# Patient Record
Sex: FEMALE | Race: WHITE | Employment: OTHER | URBAN - METROPOLITAN AREA
[De-identification: names, ages, dates, MRNs, and addresses within clinical notes are randomized per-mention and may not be internally consistent; named-entity substitution may affect disease eponyms.]

---

## 2017-02-17 ENCOUNTER — TELEPHONE (OUTPATIENT)
Dept: FAMILY MEDICINE CLINIC | Facility: CLINIC | Age: 77
End: 2017-02-17

## 2017-02-17 DIAGNOSIS — E11.9 TYPE 2 DIABETES MELLITUS WITHOUT COMPLICATION, WITHOUT LONG-TERM CURRENT USE OF INSULIN (HCC): Primary | ICD-10-CM

## 2017-02-17 RX ORDER — BISOPROLOL FUMARATE AND HYDROCHLOROTHIAZIDE 6.25; 5 MG/1; MG/1
1 TABLET ORAL DAILY
COMMUNITY
Start: 2007-06-26 | End: 2017-06-05

## 2017-02-17 RX ORDER — ENALAPRIL MALEATE 10 MG/1
1 TABLET ORAL DAILY
COMMUNITY
Start: 2007-06-26 | End: 2017-06-05

## 2017-02-17 RX ORDER — DILTIAZEM HYDROCHLORIDE 120 MG/1
1 CAPSULE, COATED, EXTENDED RELEASE ORAL 2 TIMES DAILY
COMMUNITY
Start: 2009-06-23 | End: 2017-06-05

## 2017-02-17 RX ORDER — LEVOTHYROXINE SODIUM 0.1 MG/1
1 TABLET ORAL
COMMUNITY
Start: 2007-06-26 | End: 2017-12-08

## 2017-02-17 RX ORDER — CETIRIZINE HYDROCHLORIDE 10 MG/1
1 TABLET ORAL DAILY PRN
COMMUNITY
Start: 2011-06-10

## 2017-02-17 RX ORDER — ASPIRIN 81 MG/1
1 TABLET ORAL DAILY
COMMUNITY
Start: 2012-06-15 | End: 2018-03-16

## 2017-02-25 ENCOUNTER — TELEPHONE (OUTPATIENT)
Dept: FAMILY MEDICINE CLINIC | Facility: CLINIC | Age: 77
End: 2017-02-25

## 2017-02-25 NOTE — TELEPHONE ENCOUNTER
Patient requesting refill Victoza/Pen [place].   Can Dockery, 02/25/2017, 10:58 AM

## 2017-05-04 ENCOUNTER — OFFICE VISIT (OUTPATIENT)
Dept: FAMILY MEDICINE CLINIC | Facility: CLINIC | Age: 77
End: 2017-05-04

## 2017-05-04 VITALS
SYSTOLIC BLOOD PRESSURE: 136 MMHG | RESPIRATION RATE: 16 BRPM | DIASTOLIC BLOOD PRESSURE: 86 MMHG | OXYGEN SATURATION: 98 % | HEART RATE: 69 BPM | TEMPERATURE: 99 F | WEIGHT: 137.13 LBS | HEIGHT: 60 IN | BODY MASS INDEX: 26.92 KG/M2

## 2017-05-04 DIAGNOSIS — B96.89 BACTERIAL CONJUNCTIVITIS OF BOTH EYES: Primary | ICD-10-CM

## 2017-05-04 DIAGNOSIS — J20.9 ACUTE BRONCHITIS, UNSPECIFIED ORGANISM: ICD-10-CM

## 2017-05-04 DIAGNOSIS — H10.9 BACTERIAL CONJUNCTIVITIS OF BOTH EYES: Primary | ICD-10-CM

## 2017-05-04 PROCEDURE — 99214 OFFICE O/P EST MOD 30 MIN: CPT | Performed by: NURSE PRACTITIONER

## 2017-05-04 RX ORDER — AZITHROMYCIN 250 MG/1
TABLET, FILM COATED ORAL
Qty: 6 TABLET | Refills: 0 | Status: SHIPPED | OUTPATIENT
Start: 2017-05-04 | End: 2017-05-08 | Stop reason: ALTCHOICE

## 2017-05-04 RX ORDER — CIPROFLOXACIN HYDROCHLORIDE 3.5 MG/ML
SOLUTION/ DROPS TOPICAL
Qty: 10 ML | Refills: 0 | Status: SHIPPED | OUTPATIENT
Start: 2017-05-04 | End: 2017-11-03

## 2017-05-04 NOTE — PROGRESS NOTES
CHIEF COMPLAINT:   Patient presents with:  Eye Problem      HPI:   Brittanie Pacheco is a 76year old female who presents with chief complaint of \"pink eye\" that first started in left eye this morning and is now moving on to the right eye within last few Subcutaneous Solution Pen-injector Inject 1.8 mg into the skin daily. Disp: 9 pen Rfl: 3   Insulin Pen Needle (BD PEN NEEDLE SHORT U/F) 31G X 8 MM Does not apply Misc Inject 1 each into the skin daily.  Use with Victoza Disp: 90 each Rfl: 3   aspirin 81 MG and injected, no discharge or crusting noted. No orbital swelling or erythema or pain with palpation of the orbits. HENT: atraumatic, normocephalic, mucoid nasal discharge, nasal mucosa erythematous and edematous.   TMs pearly, no retraction, no bulging, is asked to follow up with their PCP prn.     MAXWELL Figueroa

## 2017-05-04 NOTE — PATIENT INSTRUCTIONS
Push fluids, rest.  Antibiotic as prescribed, take with food. Take antibiotic eye drops to both eyes as prescribed. Tylenol or ibuprofen over the counter for discomfort. Return if symptoms worsen or do not improve in 2-3 days.     Go to ER if vision sigrid

## 2017-05-08 ENCOUNTER — LAB ENCOUNTER (OUTPATIENT)
Dept: LAB | Age: 77
End: 2017-05-08
Attending: NURSE PRACTITIONER

## 2017-05-08 ENCOUNTER — TELEPHONE (OUTPATIENT)
Dept: FAMILY MEDICINE CLINIC | Facility: CLINIC | Age: 77
End: 2017-05-08

## 2017-05-08 ENCOUNTER — OFFICE VISIT (OUTPATIENT)
Dept: FAMILY MEDICINE CLINIC | Facility: CLINIC | Age: 77
End: 2017-05-08

## 2017-05-08 VITALS
DIASTOLIC BLOOD PRESSURE: 74 MMHG | SYSTOLIC BLOOD PRESSURE: 112 MMHG | RESPIRATION RATE: 18 BRPM | HEART RATE: 106 BPM | WEIGHT: 132.63 LBS | HEIGHT: 60 IN | BODY MASS INDEX: 26.04 KG/M2 | OXYGEN SATURATION: 91 % | TEMPERATURE: 98 F

## 2017-05-08 DIAGNOSIS — J20.9 ACUTE BRONCHITIS, UNSPECIFIED ORGANISM: Primary | ICD-10-CM

## 2017-05-08 DIAGNOSIS — R05.9 COUGHING: ICD-10-CM

## 2017-05-08 DIAGNOSIS — R06.2 WHEEZING: ICD-10-CM

## 2017-05-08 PROCEDURE — 94640 AIRWAY INHALATION TREATMENT: CPT | Performed by: NURSE PRACTITIONER

## 2017-05-08 PROCEDURE — 99214 OFFICE O/P EST MOD 30 MIN: CPT | Performed by: NURSE PRACTITIONER

## 2017-05-08 PROCEDURE — 85025 COMPLETE CBC W/AUTO DIFF WBC: CPT

## 2017-05-08 PROCEDURE — 80053 COMPREHEN METABOLIC PANEL: CPT

## 2017-05-08 RX ORDER — METHYLPREDNISOLONE 4 MG/1
TABLET ORAL
Qty: 1 KIT | Refills: 0 | Status: SHIPPED | OUTPATIENT
Start: 2017-05-08 | End: 2017-11-03 | Stop reason: ALTCHOICE

## 2017-05-08 RX ORDER — IPRATROPIUM BROMIDE AND ALBUTEROL SULFATE 2.5; .5 MG/3ML; MG/3ML
3 SOLUTION RESPIRATORY (INHALATION) ONCE
Status: COMPLETED | OUTPATIENT
Start: 2017-05-08 | End: 2017-05-08

## 2017-05-08 RX ORDER — CODEINE PHOSPHATE AND GUAIFENESIN 10; 100 MG/5ML; MG/5ML
10 SOLUTION ORAL NIGHTLY PRN
Qty: 100 ML | Refills: 0 | Status: SHIPPED | OUTPATIENT
Start: 2017-05-08 | End: 2018-04-11 | Stop reason: ALTCHOICE

## 2017-05-08 RX ORDER — LEVOFLOXACIN 500 MG/1
500 TABLET, FILM COATED ORAL DAILY
Qty: 10 TABLET | Refills: 0 | Status: SHIPPED | OUTPATIENT
Start: 2017-05-08 | End: 2017-05-18

## 2017-05-08 RX ADMIN — IPRATROPIUM BROMIDE AND ALBUTEROL SULFATE 3 ML: 2.5; .5 SOLUTION RESPIRATORY (INHALATION) at 11:17:00

## 2017-05-08 NOTE — PROGRESS NOTES
Chief complaint:  Patient presents with:  Cough: chest congestion, and bad cough      HPI:   Michelle Beckett is a 76year old female who presents for worsening upper respiratory symptoms.  Was seen 4 days ago for bacterial conjunctivitis and acute bronchit Therapy Pack As directed. Disp: 1 kit Rfl: 0   guaiFENesin-codeine (CHERATUSSIN AC) 100-10 MG/5ML Oral Solution Take 10 mL by mouth nightly as needed for cough.  Do not use iwth alcohol or while driving or operating heavy machinery Disp: 100 mL Rfl: 0   cip headaches    EXAM:   /74 mmHg  Pulse 106  Temp(Src) 97.7 °F (36.5 °C) (Temporal)  Resp 18  Ht 60\"  Wt 132 lb 9.6 oz  BMI 25.90 kg/m2  SpO2 91%  GENERAL: well developed, well nourished,in no apparent distress  SKIN: no rashes,no suspicious lesions  E rest.  Antibiotic as prescribed, take with food. Take medrol dose pack as prescribed. Tylenol or ibuprofen over the counter for discomfort. Mucinex over the counter for symptom relief. Cheratussin with codeine as prescribed.  No alcohol or driving with

## 2017-05-08 NOTE — PATIENT INSTRUCTIONS
Push fluids, rest.  Antibiotic as prescribed, take with food. Take medrol dose pack as prescribed. Tylenol or ibuprofen over the counter for discomfort. Mucinex over the counter for symptom relief. Cheratussin with codeine as prescribed.  No alcohol or

## 2017-05-08 NOTE — TELEPHONE ENCOUNTER
Patient saw Rebel Rapp 5/4. Now having URI Sx. Cough/Congestion. Appt given 10:45 with Johanny for evaluation.   Tee Ambrose, 05/08/2017, 8:46 AM

## 2017-05-09 ENCOUNTER — TELEPHONE (OUTPATIENT)
Dept: FAMILY MEDICINE CLINIC | Facility: CLINIC | Age: 77
End: 2017-05-09

## 2017-05-09 NOTE — TELEPHONE ENCOUNTER
Pt advised of blood work. Stated she was able to get her Levaquin today and she doesn't feel \"great\" but she feels better than she did yesterday.   I advised regarding follow up blood work and pt verbalized that she has her annal physical with Dr. Griffin Celestin

## 2017-05-09 NOTE — TELEPHONE ENCOUNTER
----- Message from MAXWELL Olmedo sent at 5/9/2017 12:04 PM CDT -----  CMP: BS elevated, pt not fasting. Creatinine and GFR elevated. Last GFR 57 and Cr. 0.96 in Dec 2016.  Will recheck CMP in 1-2 months to reassess as pt is currently being treated for ac

## 2017-05-12 ENCOUNTER — OFFICE VISIT (OUTPATIENT)
Dept: FAMILY MEDICINE CLINIC | Facility: CLINIC | Age: 77
End: 2017-05-12

## 2017-05-12 ENCOUNTER — TELEPHONE (OUTPATIENT)
Dept: FAMILY MEDICINE CLINIC | Facility: CLINIC | Age: 77
End: 2017-05-12

## 2017-05-12 VITALS
TEMPERATURE: 98 F | DIASTOLIC BLOOD PRESSURE: 80 MMHG | WEIGHT: 129.81 LBS | HEIGHT: 61 IN | HEART RATE: 62 BPM | SYSTOLIC BLOOD PRESSURE: 118 MMHG | RESPIRATION RATE: 14 BRPM | BODY MASS INDEX: 24.51 KG/M2

## 2017-05-12 DIAGNOSIS — E11.9 CONTROLLED TYPE 2 DIABETES MELLITUS WITHOUT COMPLICATION, WITHOUT LONG-TERM CURRENT USE OF INSULIN (HCC): ICD-10-CM

## 2017-05-12 DIAGNOSIS — H66.002 ACUTE SUPPURATIVE OTITIS MEDIA OF LEFT EAR WITHOUT SPONTANEOUS RUPTURE OF TYMPANIC MEMBRANE, RECURRENCE NOT SPECIFIED: ICD-10-CM

## 2017-05-12 DIAGNOSIS — J44.1 CHRONIC OBSTRUCTIVE PULMONARY DISEASE WITH ACUTE EXACERBATION (HCC): Primary | ICD-10-CM

## 2017-05-12 PROBLEM — H66.90 OTITIS MEDIA: Status: ACTIVE | Noted: 2017-05-12

## 2017-05-12 PROCEDURE — 99213 OFFICE O/P EST LOW 20 MIN: CPT | Performed by: FAMILY MEDICINE

## 2017-05-12 RX ORDER — PROMETHAZINE HYDROCHLORIDE AND CODEINE PHOSPHATE 6.25; 1 MG/5ML; MG/5ML
5 SYRUP ORAL EVERY 4 HOURS PRN
Qty: 120 ML | Refills: 1 | Status: SHIPPED | OUTPATIENT
Start: 2017-05-12 | End: 2018-03-16 | Stop reason: ALTCHOICE

## 2017-05-12 NOTE — PROGRESS NOTES
[facility] [place]  PROGRESS NOTE  Chief Complaint:   Patient presents with:  Ear Pain: ear is plugged up, having trouble sleeping, had bronchitis 3 weeks ago      HPI:   This is a 76year old female coming in for follow-up of her bronchitis.   Cori Clemente 0. 00-1.00 x10(3) uL   Neutrophil % 58.4 %   Lymphocyte % 25.1 %   Monocyte % 12.4 %   Eosinophil % 2.5 %   Basophil % 1.1 %   Immature Granulocyte % 0.5 %       Past Medical History   Diagnosis Date   • Diabetes [facility])    • Sick sinus syndrome ([facility] [address]    • Es Take 1 tablet by mouth daily. Disp:  Rfl:    bisoprolol-hydrochlorothiazide 5-6.25 MG Oral Tab Take 1 tablet by mouth daily. Disp:  Rfl:    cetirizine (ALL DAY ALLERGY) 10 MG Oral Tab Take 1 tablet by mouth daily as needed.  Disp:  Rfl:    DiltiaZEM HCl ER intolerance, polyuria or polydipsia. She has not had any signs or symptoms of low blood sugars.     EXAM:   /80 mmHg  Pulse 62  Temp(Src) 97.6 °F (36.4 °C) (Temporal)  Resp 14  Ht 61\"  Wt 129 lb 12.8 oz  BMI 24.54 kg/m2 Estimated body mass index is infection in the left ear. Plan: The current antibiotic should be adequate to control this. I did ask her to start using the Cipro eyedrops that she had in her ear twice a day until they are gone in the next 3 or 4 days. That should also be helpful.

## 2017-05-12 NOTE — TELEPHONE ENCOUNTER
Patient went ahead and made appt with Dr Funez  for Today 10:30.   Denise Quach, 05/12/2017, 9:36 AM

## 2017-05-12 NOTE — PATIENT INSTRUCTIONS
Use the Cipro eye drops in left ear 1 drop twice a day until the infection is cleared up. Take the cough medicine at bed time.

## 2017-05-17 ENCOUNTER — TELEPHONE (OUTPATIENT)
Dept: FAMILY MEDICINE CLINIC | Facility: CLINIC | Age: 77
End: 2017-05-17

## 2017-05-17 NOTE — TELEPHONE ENCOUNTER
Patient informed of below. Expressed understanding. Also states Her Tongue is Red and Painful. It is not white. Please advise.   Lacho Carrera, 05/17/2017, 11:17 AM

## 2017-05-17 NOTE — TELEPHONE ENCOUNTER
Patient states She is feeling better, but ear remains plugged. States She was a little dizzy yesterday. Has not used any Nasal Spray for ear congestion. Please advise.   Jorge Bill, 05/17/2017, 10:21 AM

## 2017-05-17 NOTE — TELEPHONE ENCOUNTER
Please add over the counter Benadryl 25 mg at bed time. Finish the prescription for antibiotics. Expect the ear fulness and pressure to last another 3-4 days.

## 2017-05-31 ENCOUNTER — APPOINTMENT (OUTPATIENT)
Dept: LAB | Age: 77
End: 2017-05-31
Attending: FAMILY MEDICINE
Payer: MEDICARE

## 2017-05-31 DIAGNOSIS — E11.9 TYPE 2 DIABETES MELLITUS WITHOUT COMPLICATION, WITHOUT LONG-TERM CURRENT USE OF INSULIN (HCC): ICD-10-CM

## 2017-05-31 PROCEDURE — 82043 UR ALBUMIN QUANTITATIVE: CPT

## 2017-05-31 PROCEDURE — 36415 COLL VENOUS BLD VENIPUNCTURE: CPT

## 2017-05-31 PROCEDURE — 82570 ASSAY OF URINE CREATININE: CPT

## 2017-05-31 PROCEDURE — 83036 HEMOGLOBIN GLYCOSYLATED A1C: CPT

## 2017-05-31 PROCEDURE — 80053 COMPREHEN METABOLIC PANEL: CPT

## 2017-06-05 ENCOUNTER — OFFICE VISIT (OUTPATIENT)
Dept: FAMILY MEDICINE CLINIC | Facility: CLINIC | Age: 77
End: 2017-06-05

## 2017-06-05 VITALS
WEIGHT: 131.63 LBS | RESPIRATION RATE: 16 BRPM | TEMPERATURE: 97 F | SYSTOLIC BLOOD PRESSURE: 146 MMHG | HEIGHT: 59.25 IN | HEART RATE: 74 BPM | BODY MASS INDEX: 26.54 KG/M2 | DIASTOLIC BLOOD PRESSURE: 78 MMHG

## 2017-06-05 DIAGNOSIS — E11.9 CONTROLLED TYPE 2 DIABETES MELLITUS WITHOUT COMPLICATION, WITHOUT LONG-TERM CURRENT USE OF INSULIN (HCC): Primary | ICD-10-CM

## 2017-06-05 DIAGNOSIS — E03.9 ACQUIRED HYPOTHYROIDISM: ICD-10-CM

## 2017-06-05 DIAGNOSIS — J44.1 CHRONIC OBSTRUCTIVE PULMONARY DISEASE WITH ACUTE EXACERBATION (HCC): ICD-10-CM

## 2017-06-05 DIAGNOSIS — E78.49 FAMILIAL MULTIPLE LIPOPROTEIN-TYPE HYPERLIPIDEMIA: ICD-10-CM

## 2017-06-05 DIAGNOSIS — I48.20 CHRONIC ATRIAL FIBRILLATION (HCC): ICD-10-CM

## 2017-06-05 DIAGNOSIS — I10 ESSENTIAL HYPERTENSION: ICD-10-CM

## 2017-06-05 DIAGNOSIS — H66.002 ACUTE SUPPURATIVE OTITIS MEDIA OF LEFT EAR WITHOUT SPONTANEOUS RUPTURE OF TYMPANIC MEMBRANE, RECURRENCE NOT SPECIFIED: ICD-10-CM

## 2017-06-05 PROCEDURE — 99214 OFFICE O/P EST MOD 30 MIN: CPT | Performed by: FAMILY MEDICINE

## 2017-06-05 RX ORDER — AMOXICILLIN AND CLAVULANATE POTASSIUM 875; 125 MG/1; MG/1
1 TABLET, FILM COATED ORAL 2 TIMES DAILY
Qty: 20 TABLET | Refills: 0 | Status: SHIPPED | OUTPATIENT
Start: 2017-06-05 | End: 2017-06-15

## 2017-06-05 RX ORDER — DILTIAZEM HYDROCHLORIDE 120 MG/1
120 CAPSULE, COATED, EXTENDED RELEASE ORAL 2 TIMES DAILY
Qty: 180 CAPSULE | Refills: 3 | Status: SHIPPED | OUTPATIENT
Start: 2017-06-05 | End: 2018-06-08

## 2017-06-05 RX ORDER — DILTIAZEM HYDROCHLORIDE 120 MG/1
120 CAPSULE, COATED, EXTENDED RELEASE ORAL 2 TIMES DAILY
Qty: 180 CAPSULE | Refills: 3 | Status: SHIPPED | OUTPATIENT
Start: 2017-06-05 | End: 2017-06-05

## 2017-06-05 RX ORDER — BISOPROLOL FUMARATE AND HYDROCHLOROTHIAZIDE 6.25; 5 MG/1; MG/1
1 TABLET ORAL DAILY
Qty: 90 TABLET | Refills: 3 | Status: SHIPPED | OUTPATIENT
Start: 2017-06-05 | End: 2018-06-08

## 2017-06-05 RX ORDER — ENALAPRIL MALEATE 10 MG/1
10 TABLET ORAL DAILY
Qty: 90 TABLET | Refills: 3 | Status: SHIPPED | OUTPATIENT
Start: 2017-06-05 | End: 2018-06-08

## 2017-06-05 RX ORDER — ENALAPRIL MALEATE 10 MG/1
10 TABLET ORAL DAILY
Qty: 90 TABLET | Refills: 3 | Status: SHIPPED | OUTPATIENT
Start: 2017-06-05 | End: 2017-06-05

## 2017-06-05 RX ORDER — BISOPROLOL FUMARATE AND HYDROCHLOROTHIAZIDE 6.25; 5 MG/1; MG/1
1 TABLET ORAL DAILY
Qty: 90 TABLET | Refills: 3 | Status: SHIPPED | OUTPATIENT
Start: 2017-06-05 | End: 2017-06-05

## 2017-06-05 NOTE — PROGRESS NOTES
[address]  PROGRESS NOTE  Chief Complaint:   Patient presents with: Follow - Up: blood work       HPI:   This is a 76year old female coming in for follow-up. She said that she still has a little bit of a cough.   It is better than i History:  Family History   Problem Relation Age of Onset   • Cancer Father    • Cancer Mother      Allergies:    Pravastatin                 Comment:Other reaction(s): severe muscle aches  Sulfa Antibiotics           Comment:Other reaction(s): developed ra Rfl:    Glucose Blood (FREESTYLE LITE TEST) In Vitro Strip 1 strip by Finger stick route daily.  Dx:  E11.9  Non Insulin Dependent Disp:  Rfl:    Levothyroxine Sodium 100 MCG Oral Tab Take 1 tablet by mouth every morning before breakfast. Disp:  Rfl:    [DI stool.  MUSCULOSKELETAL:  Denies weakness, muscle aches, back pain, joint pain, swelling or stiffness.   NEUROLOGICAL:  Denies headache, seizures, dizziness, syncope, paralysis, ataxia, numbness or tingling in the extremities,change in bowel or bladder cont negative, FROM. EXTREMITIES:  No edema, no cyanosis, no clubbing, FROM, 2+ dorsalis pedis pulses bilaterally. NEURO:  No deficit, normal gait, strength and tone, sensory intact, normal reflexes.   Bilateral barefoot skin diabetic exam is normal, visualize (two) times daily. bisoprolol-hydrochlorothiazide 5-6.25 MG Oral Tab 90 tablet 3      Sig: Take 1 tablet by mouth daily.       DilTIAZem HCl ER Coated Beads (CARDIZEM CD) 120 MG Oral Capsule SR 24 Hr 180 capsule 3      Sig: Take 1 capsule (120 mg total

## 2017-06-14 ENCOUNTER — TELEPHONE (OUTPATIENT)
Dept: FAMILY MEDICINE CLINIC | Facility: CLINIC | Age: 77
End: 2017-06-14

## 2017-06-14 NOTE — TELEPHONE ENCOUNTER
Needing new Rx for Metformin. Went in as 1 tab daily. Needing new Rx for BID. Please advise.   Felix White, 06/14/2017, 8:48 AM

## 2017-08-30 ENCOUNTER — PATIENT OUTREACH (OUTPATIENT)
Dept: FAMILY MEDICINE CLINIC | Facility: CLINIC | Age: 77
End: 2017-08-30

## 2017-10-16 RX ORDER — BLOOD-GLUCOSE METER
KIT MISCELLANEOUS
Qty: 100 STRIP | Refills: 3 | Status: SHIPPED | OUTPATIENT
Start: 2017-10-16 | End: 2018-12-11

## 2017-10-16 NOTE — TELEPHONE ENCOUNTER
Future appt:     Your appointments     Date & Time Appointment Department [facility])    Dec 06, 2017  8:15 AM CST Laboratory Visit with REF Luis [facility] ([facility])    Dec 08, 2017  8:00 AM CST Medicare Annual Well Visit with Sirena Hernadez

## 2017-11-03 ENCOUNTER — OFFICE VISIT (OUTPATIENT)
Dept: FAMILY MEDICINE CLINIC | Facility: CLINIC | Age: 77
End: 2017-11-03

## 2017-11-03 ENCOUNTER — TELEPHONE (OUTPATIENT)
Dept: FAMILY MEDICINE CLINIC | Facility: CLINIC | Age: 77
End: 2017-11-03

## 2017-11-03 VITALS
WEIGHT: 132.19 LBS | HEART RATE: 64 BPM | SYSTOLIC BLOOD PRESSURE: 138 MMHG | RESPIRATION RATE: 16 BRPM | HEIGHT: 61 IN | OXYGEN SATURATION: 98 % | TEMPERATURE: 98 F | DIASTOLIC BLOOD PRESSURE: 80 MMHG | BODY MASS INDEX: 24.96 KG/M2

## 2017-11-03 DIAGNOSIS — J21.9 ACUTE BRONCHIOLITIS DUE TO UNSPECIFIED ORGANISM: Primary | ICD-10-CM

## 2017-11-03 DIAGNOSIS — J44.9 CHRONIC OBSTRUCTIVE PULMONARY DISEASE, UNSPECIFIED COPD TYPE (HCC): ICD-10-CM

## 2017-11-03 PROCEDURE — 99213 OFFICE O/P EST LOW 20 MIN: CPT | Performed by: NURSE PRACTITIONER

## 2017-11-03 RX ORDER — AMOXICILLIN AND CLAVULANATE POTASSIUM 875; 125 MG/1; MG/1
1 TABLET, FILM COATED ORAL 2 TIMES DAILY
Qty: 20 TABLET | Refills: 0 | Status: SHIPPED | OUTPATIENT
Start: 2017-11-03 | End: 2017-11-13

## 2017-11-03 RX ORDER — INFLUENZA A VIRUSA/MICHIGAN/45/2015 X-275 (H1N1) ANTIGEN (FORMALDEHYDE INACTIVATED), INFLUENZA A VIRUS A/HONG KONG/4801/2014 X-263B (H3N2) ANTIGEN (FORMALDEHYDE INACTIVATED), AND INFLUENZA B VIRUS B/BRISBANE/60/2008 ANTIGEN (FORMALDEHYDE INACTIVATED) 60; 60; 60 UG/.5ML; UG/.5ML; UG/.5ML
INJECTION, SUSPENSION INTRAMUSCULAR
Refills: 0 | COMMUNITY
Start: 2017-10-05 | End: 2017-12-08 | Stop reason: ALTCHOICE

## 2017-11-03 RX ORDER — ALBUTEROL SULFATE 90 UG/1
2 AEROSOL, METERED RESPIRATORY (INHALATION) EVERY 6 HOURS PRN
Qty: 1 INHALER | Refills: 0 | Status: SHIPPED | OUTPATIENT
Start: 2017-11-03

## 2017-11-03 RX ORDER — ALBUTEROL SULFATE 90 UG/1
2 AEROSOL, METERED RESPIRATORY (INHALATION) EVERY 6 HOURS PRN
Qty: 1 INHALER | Refills: 0 | Status: SHIPPED | OUTPATIENT
Start: 2017-11-03 | End: 2017-11-03

## 2017-11-03 NOTE — PATIENT INSTRUCTIONS
Push fluids, rest.  Antibiotic as prescribed, take with food. Use albterol inhaler 2 puffs every 6 hours x 3 days, then as prescribed as needed. Tylenol or ibuprofen over the counter for discomfort. Mucinex over the counter for symptom relief.   Return i · Most sore throats. Sore throats are most often caused by viruses. Your throat may feel scratchy or achy, and it may hurt to swallow. You may also have a low fever and body aches. A sore throat usually gets better in a few days. · Most ear infections.  An Your doctor may also recommend antibiotics if you have a condition that can affect your immune system, such as diabetes or cancer.   Self-care at [facility]  If your infection can’t be treated with antibiotics, you can take other steps to feel better.  Try the r Air travels in and out of the lungs through the airways. The linings of these airways produce sticky mucus. This mucus traps particles that enter the lungs. Tiny structures called cilia then sweep the particles out of the airways.      Healthy airway: Airwa The main treatment for bronchitis is easing symptoms. Avoiding smoke, allergens, and other things that trigger coughing can often help. If the infection is bacterial, you may be given antibiotics. During the illness, it's important to get plenty of sleep. You should see your provider again in 2 to 3 weeks. By this time, symptoms should have improved. An infection that lasts longer may mean you have a more serious problem. Prevention  · Avoid tobacco smoke. If you smoke, quit. Stay away from smoky places.  A

## 2017-11-03 NOTE — PROGRESS NOTES
Chief complaint:  Patient presents with:  Bronchitis: pt states she has bronchitis. about a week      HPI:   Vera Lopez is a 76year old female who presents for upper respiratory symptoms for  1  Week.  C/o: sinus congestion that has progress into cou Enalapril Maleate 10 MG Oral Tab Take 1 tablet (10 mg total) by mouth daily. Disp: 90 tablet Rfl: 3   promethazine-codeine 6.25-10 MG/5ML Oral Syrup Take 5 mL by mouth every 4 (four) hours as needed for cough.  Disp: 120 mL Rfl: 1   guaiFENesin-codeine (HIMANSHU EXAM:   /80 (BP Location: Left arm, Patient Position: Sitting, Cuff Size: adult)   Pulse 64   Temp (!) 97.5 °F (36.4 °C) (Temporal)   Resp 16   Ht 61\"   Wt 132 lb 3.2 oz   SpO2 98%   BMI 24.98 kg/m²   GENERAL: well developed, well nourished,in no ap Tylenol or ibuprofen over the counter for discomfort. Mucinex over the counter for symptom relief. Return if symptoms worsen or do not improve in 2-3 days.     When to Use Antibiotics   Antibiotics are medicines used to treat infections caused by bacteria · Most ear infections. An ear infection may be caused by a virus or bacteria. It causes pain in the ear. Antibiotics usually don’t help, and the infection goes away on its own. · Most sinus infections (sinusitis).  This kind of infection causes sinus pain If your infection can’t be treated with antibiotics, you can take other steps to feel better. Try the remedies below. In general:   · Rest and sleep as much as needed. · Drink water and other clear fluids.   · Don’t smoke, and avoid smoke from other people Air travels in and out of the lungs through the airways. The linings of these airways produce sticky mucus. This mucus traps particles that enter the lungs. Tiny structures called cilia then sweep the particles out of the airways.      Healthy airway: Airwa The main treatment for bronchitis is easing symptoms. Avoiding smoke, allergens, and other things that trigger coughing can often help. If the infection is bacterial, you may be given antibiotics. During the illness, it's important to get plenty of sleep. You should see your provider again in 2 to 3 weeks. By this time, symptoms should have improved. An infection that lasts longer may mean you have a more serious problem. Prevention  · Avoid tobacco smoke. If you smoke, quit. Stay away from smoky places.  A

## 2017-11-03 NOTE — TELEPHONE ENCOUNTER
Patient states She has been up the past 2 nights coughing. Feels She has bronchitis. Appt given 1030 with Johanny for evaluation.   Paolo Ramesh, 11/03/17, 8:42 AM

## 2017-11-04 ENCOUNTER — TELEPHONE (OUTPATIENT)
Dept: FAMILY MEDICINE CLINIC | Facility: CLINIC | Age: 77
End: 2017-11-04

## 2017-11-04 NOTE — TELEPHONE ENCOUNTER
Received fax for prior authorization on pro-air. Called pharmacy notified albuterol was ordered. Pharmacy states it was a error on their part. States will give albuterol inhaler.

## 2017-11-24 RX ORDER — LIRAGLUTIDE 6 MG/ML
INJECTION SUBCUTANEOUS
Qty: 27 ML | Refills: 3 | Status: SHIPPED | OUTPATIENT
Start: 2017-11-24 | End: 2018-06-08

## 2017-11-24 NOTE — TELEPHONE ENCOUNTER
Future appt:     Your appointments     Date & Time Appointment Department [facility])    Dec 06, 2017  8:15 AM CST Laboratory Visit with REF Letty Mcadams [facility] ([facility])    Dec 08, 2017  8:00 AM CST Medicare Annual Well Visit with Sonya Javed

## 2017-12-06 ENCOUNTER — APPOINTMENT (OUTPATIENT)
Dept: LAB | Age: 77
End: 2017-12-06
Attending: FAMILY MEDICINE
Payer: MEDICARE

## 2017-12-06 DIAGNOSIS — I10 ESSENTIAL HYPERTENSION: ICD-10-CM

## 2017-12-06 DIAGNOSIS — E78.49 FAMILIAL MULTIPLE LIPOPROTEIN-TYPE HYPERLIPIDEMIA: ICD-10-CM

## 2017-12-06 DIAGNOSIS — E11.9 CONTROLLED TYPE 2 DIABETES MELLITUS WITHOUT COMPLICATION, WITHOUT LONG-TERM CURRENT USE OF INSULIN (HCC): ICD-10-CM

## 2017-12-06 DIAGNOSIS — E03.9 ACQUIRED HYPOTHYROIDISM: ICD-10-CM

## 2017-12-06 PROCEDURE — 80061 LIPID PANEL: CPT

## 2017-12-06 PROCEDURE — 36415 COLL VENOUS BLD VENIPUNCTURE: CPT

## 2017-12-06 PROCEDURE — 84550 ASSAY OF BLOOD/URIC ACID: CPT

## 2017-12-06 PROCEDURE — 80053 COMPREHEN METABOLIC PANEL: CPT

## 2017-12-06 PROCEDURE — 83036 HEMOGLOBIN GLYCOSYLATED A1C: CPT

## 2017-12-06 PROCEDURE — 84443 ASSAY THYROID STIM HORMONE: CPT

## 2017-12-08 ENCOUNTER — OFFICE VISIT (OUTPATIENT)
Dept: FAMILY MEDICINE CLINIC | Facility: CLINIC | Age: 77
End: 2017-12-08

## 2017-12-08 VITALS
TEMPERATURE: 97 F | SYSTOLIC BLOOD PRESSURE: 160 MMHG | DIASTOLIC BLOOD PRESSURE: 84 MMHG | WEIGHT: 128 LBS | HEIGHT: 61 IN | BODY MASS INDEX: 24.17 KG/M2 | HEART RATE: 76 BPM | RESPIRATION RATE: 16 BRPM

## 2017-12-08 DIAGNOSIS — I48.20 CHRONIC ATRIAL FIBRILLATION (HCC): ICD-10-CM

## 2017-12-08 DIAGNOSIS — E03.9 ACQUIRED HYPOTHYROIDISM: ICD-10-CM

## 2017-12-08 DIAGNOSIS — Z00.00 ENCOUNTER FOR ANNUAL HEALTH EXAMINATION: Primary | ICD-10-CM

## 2017-12-08 DIAGNOSIS — I10 ESSENTIAL HYPERTENSION: ICD-10-CM

## 2017-12-08 DIAGNOSIS — J44.9 CHRONIC OBSTRUCTIVE PULMONARY DISEASE, UNSPECIFIED COPD TYPE (HCC): ICD-10-CM

## 2017-12-08 DIAGNOSIS — E11.9 CONTROLLED TYPE 2 DIABETES MELLITUS WITHOUT COMPLICATION, WITHOUT LONG-TERM CURRENT USE OF INSULIN (HCC): ICD-10-CM

## 2017-12-08 DIAGNOSIS — E78.49 FAMILIAL MULTIPLE LIPOPROTEIN-TYPE HYPERLIPIDEMIA: ICD-10-CM

## 2017-12-08 PROBLEM — H66.90 OTITIS MEDIA: Status: RESOLVED | Noted: 2017-05-12 | Resolved: 2017-12-08

## 2017-12-08 PROCEDURE — G0439 PPPS, SUBSEQ VISIT: HCPCS | Performed by: FAMILY MEDICINE

## 2017-12-08 PROCEDURE — 99214 OFFICE O/P EST MOD 30 MIN: CPT | Performed by: FAMILY MEDICINE

## 2017-12-08 RX ORDER — LEVOTHYROXINE SODIUM 0.1 MG/1
100 TABLET ORAL
Qty: 90 TABLET | Refills: 3 | Status: SHIPPED | OUTPATIENT
Start: 2017-12-08 | End: 2018-12-11

## 2017-12-08 NOTE — PATIENT INSTRUCTIONS
Recommended Websites for Advanced Directives    [email or web address].no. org/publications/Documents/personal_dec. pdf  An information packet, including necessary form from the [address] website. http://[email or web address] [place]state. [place]/public/books/adv

## 2017-12-08 NOTE — PROGRESS NOTES
[facility] [place]  PROGRESS NOTE  Chief Complaint:   Patient presents with:  Physical      HPI:   This is a 76year old female coming in for her annual Medicare wellness exam.    She said that she has been feeling very good.   She had multiple f [facility]      Past Surgical History:  2015: COLONOSCOPY  2004: HYSTERECTOMY  No date: TONSILLECTOMY  Social History:  Smoking status: Never Smoker                                                              Smokeless tobacco: Never Used Vitro Strip USE ONE STRIP ONCE DAILY DX  E11.9 Disp: 100 strip Rfl: 3   promethazine-codeine 6.25-10 MG/5ML Oral Syrup Take 5 mL by mouth every 4 (four) hours as needed for cough.  Disp: 120 mL Rfl: 1   guaiFENesin-codeine (CHERATUSSIN AC) 100-10 MG/5ML Linda Marcum 24.19 kg/m²  Estimated body mass index is 24.19 kg/m² as calculated from the following:    Height as of this encounter: 61\". Weight as of this encounter: 128 lb. Vital signs reviewed. Appears stated age, well groomed.   Physical Exam:  GEN:  Patient is very good. 5. Essential hypertension  Her blood pressure is mildly elevated. However she was somewhat anxious about today's exam.  We will see what her blood pressure numbers look like next visit.     6. Chronic atrial fibrillation ([address]  She has a hist

## 2017-12-19 ENCOUNTER — MED REC SCAN ONLY (OUTPATIENT)
Dept: FAMILY MEDICINE CLINIC | Facility: CLINIC | Age: 77
End: 2017-12-19

## 2018-03-15 ENCOUNTER — TELEPHONE (OUTPATIENT)
Dept: FAMILY MEDICINE CLINIC | Facility: CLINIC | Age: 78
End: 2018-03-15

## 2018-03-15 NOTE — TELEPHONE ENCOUNTER
Daniel Phlegm called back, states pt BP now-  Better:  110/60. Pulse: 74.  O2: 91%. No chest pain, no shortness of breath reported. Also mentioned that pt had stomach ache this AM that is subsiding. Daylin instructed to have pt hydrate well.  Scheduled to see

## 2018-03-15 NOTE — TELEPHONE ENCOUNTER
Dr. Valerie Guzman is out of the office today. Agree with recommendations.    Elda Swift, 03/15/18, 1:27 PM

## 2018-03-15 NOTE — TELEPHONE ENCOUNTER
Pt gave consent to speak with Daughter in law Awilda Connors). States pt is weak, c/o fatigue. States BP approx 25 minutes ago was 124/52 and repeat BP just prior to calling   Was 98/46. Claudia Look states pt pulse ox is 91-92%.    Pt is not dizzy, no other

## 2018-03-16 ENCOUNTER — OFFICE VISIT (OUTPATIENT)
Dept: FAMILY MEDICINE CLINIC | Facility: CLINIC | Age: 78
End: 2018-03-16

## 2018-03-16 VITALS
WEIGHT: 128.13 LBS | DIASTOLIC BLOOD PRESSURE: 76 MMHG | RESPIRATION RATE: 14 BRPM | BODY MASS INDEX: 24.19 KG/M2 | TEMPERATURE: 97 F | HEART RATE: 74 BPM | HEIGHT: 61 IN | SYSTOLIC BLOOD PRESSURE: 122 MMHG

## 2018-03-16 DIAGNOSIS — I48.0 PAROXYSMAL ATRIAL FIBRILLATION (HCC): Primary | ICD-10-CM

## 2018-03-16 DIAGNOSIS — E11.9 CONTROLLED TYPE 2 DIABETES MELLITUS WITHOUT COMPLICATION, WITHOUT LONG-TERM CURRENT USE OF INSULIN (HCC): ICD-10-CM

## 2018-03-16 DIAGNOSIS — I10 ESSENTIAL HYPERTENSION: ICD-10-CM

## 2018-03-16 PROCEDURE — 1111F DSCHRG MED/CURRENT MED MERGE: CPT | Performed by: FAMILY MEDICINE

## 2018-03-16 PROCEDURE — 99214 OFFICE O/P EST MOD 30 MIN: CPT | Performed by: FAMILY MEDICINE

## 2018-03-16 PROCEDURE — 93000 ELECTROCARDIOGRAM COMPLETE: CPT | Performed by: FAMILY MEDICINE

## 2018-03-17 NOTE — PROGRESS NOTES
[facility]  [facility]ESS [facility]  Chief Complaint:   Patient presents with:  Hospital F/U      HPI:   This is a 76year old female coming in for follow-up after a visit to the emergency department.   She said that 2 days ago she was feeling very TSH 0.396 0.350 - 5.500 mIU/mL   -URIC ACID, SERUM   Result Value Ref Range   Uric Acid 5.9 2.4 - 8.0 mg/dL       Past Medical History:   Diagnosis Date   • Atrial fibrillation (HCC)     Intermittent   • COPD (chronic obstructive pulmonary disease) ([facility] [address] ER Coated Beads (CARDIZEM CD) 120 MG Oral Capsule SR 24 Hr Take 1 capsule (120 mg total) by mouth 2 (two) times daily. Disp: 180 capsule Rfl: 3   Enalapril Maleate 10 MG Oral Tab Take 1 tablet (10 mg total) by mouth daily.  Disp: 90 tablet Rfl: 3   guaiFENe response, history of asthma, sneezing, hives, eczema or rhinitis.      EXAM:   /76 (BP Location: Left arm, Patient Position: Sitting, Cuff Size: adult)   Pulse 74   Temp (!) 97.3 °F (36.3 °C) (Tympanic)   Resp 14   Ht 61\"   Wt 128 lb 2 oz   BMI 24.21 recommended now. - ELECTROCARDIOGRAM, COMPLETE    3. Controlled type 2 diabetes mellitus without complication, without long-term current use of insulin (HCC)  No changes in her diabetes medications now.   - ELECTROCARDIOGRAM, COMPLETE      Meds & Refills f

## 2018-04-11 ENCOUNTER — OFFICE VISIT (OUTPATIENT)
Dept: FAMILY MEDICINE CLINIC | Facility: CLINIC | Age: 78
End: 2018-04-11

## 2018-04-11 VITALS
TEMPERATURE: 97 F | HEIGHT: 61 IN | HEART RATE: 68 BPM | SYSTOLIC BLOOD PRESSURE: 130 MMHG | WEIGHT: 131.13 LBS | BODY MASS INDEX: 24.76 KG/M2 | RESPIRATION RATE: 14 BRPM | DIASTOLIC BLOOD PRESSURE: 82 MMHG

## 2018-04-11 DIAGNOSIS — I10 ESSENTIAL HYPERTENSION: ICD-10-CM

## 2018-04-11 DIAGNOSIS — I48.20 CHRONIC ATRIAL FIBRILLATION (HCC): Primary | ICD-10-CM

## 2018-04-11 DIAGNOSIS — E11.9 CONTROLLED TYPE 2 DIABETES MELLITUS WITHOUT COMPLICATION, WITHOUT LONG-TERM CURRENT USE OF INSULIN (HCC): ICD-10-CM

## 2018-04-11 PROCEDURE — 99213 OFFICE O/P EST LOW 20 MIN: CPT | Performed by: FAMILY MEDICINE

## 2018-04-11 NOTE — PROGRESS NOTES
[address]  PROGRESS NOTE  Chief Complaint:   Patient presents with: Follow - Up      HPI:   This is a 76year old female coming in for follow-up on her atrial fib and blood thinners. She said she feels so much better now.   She said sh • Diabetes [facility])    • Essential hypertension    • H/O mammogram 12/18/2017    Negative   • Hyperlipidemia    • Sick sinus syndrome [facility])      Past Surgical History:  2015: COLONOSCOPY  2004: HYSTERECTOMY  No date: TONSILLECTOMY  Social History:  Smoking times daily. Disp: 180 capsule Rfl: 3   Enalapril Maleate 10 MG Oral Tab Take 1 tablet (10 mg total) by mouth daily. Disp: 90 tablet Rfl: 3   cetirizine (ALL DAY ALLERGY) 10 MG Oral Tab Take 1 tablet by mouth daily as needed.  Disp:  Rfl:       Counseling g well groomed. Physical Exam:  GEN:  Patient is alert, awake and oriented, well developed, well nourished, no apparent distress.   HEENT:  Head:  Normocephalic, atraumatic Eyes: EOMI, PERRLA, no scleral icterus, conjunctivae clear bilaterally, no eye discha dysrhythmia     Atrial fibrillation (HCC)     Acute bronchitis     COPD (chronic obstructive pulmonary disease) (HCC)     Diabetes mellitus type II, [facility] ([facility]     History of colonic polyps     Familial multiple lipoprotein-type hyperlipidemia     Es

## 2018-06-05 ENCOUNTER — LABORATORY ENCOUNTER (OUTPATIENT)
Dept: LAB | Age: 78
End: 2018-06-05
Attending: FAMILY MEDICINE
Payer: MEDICARE

## 2018-06-05 DIAGNOSIS — E11.9 CONTROLLED TYPE 2 DIABETES MELLITUS WITHOUT COMPLICATION, WITHOUT LONG-TERM CURRENT USE OF INSULIN (HCC): ICD-10-CM

## 2018-06-05 DIAGNOSIS — E03.9 ACQUIRED HYPOTHYROIDISM: ICD-10-CM

## 2018-06-05 PROCEDURE — 80061 LIPID PANEL: CPT

## 2018-06-05 PROCEDURE — 83036 HEMOGLOBIN GLYCOSYLATED A1C: CPT

## 2018-06-05 PROCEDURE — 82570 ASSAY OF URINE CREATININE: CPT

## 2018-06-05 PROCEDURE — 36415 COLL VENOUS BLD VENIPUNCTURE: CPT

## 2018-06-05 PROCEDURE — 82043 UR ALBUMIN QUANTITATIVE: CPT

## 2018-06-05 PROCEDURE — 85025 COMPLETE CBC W/AUTO DIFF WBC: CPT

## 2018-06-05 PROCEDURE — 80053 COMPREHEN METABOLIC PANEL: CPT

## 2018-06-05 PROCEDURE — 84550 ASSAY OF BLOOD/URIC ACID: CPT

## 2018-06-05 PROCEDURE — 84443 ASSAY THYROID STIM HORMONE: CPT

## 2018-06-08 ENCOUNTER — OFFICE VISIT (OUTPATIENT)
Dept: FAMILY MEDICINE CLINIC | Facility: CLINIC | Age: 78
End: 2018-06-08

## 2018-06-08 VITALS
DIASTOLIC BLOOD PRESSURE: 88 MMHG | TEMPERATURE: 99 F | WEIGHT: 129.63 LBS | HEIGHT: 61 IN | SYSTOLIC BLOOD PRESSURE: 130 MMHG | HEART RATE: 88 BPM | BODY MASS INDEX: 24.47 KG/M2 | RESPIRATION RATE: 16 BRPM

## 2018-06-08 DIAGNOSIS — E11.9 CONTROLLED TYPE 2 DIABETES MELLITUS WITHOUT COMPLICATION, WITHOUT LONG-TERM CURRENT USE OF INSULIN (HCC): ICD-10-CM

## 2018-06-08 DIAGNOSIS — E78.49 FAMILIAL MULTIPLE LIPOPROTEIN-TYPE HYPERLIPIDEMIA: Primary | ICD-10-CM

## 2018-06-08 DIAGNOSIS — I48.20 CHRONIC ATRIAL FIBRILLATION (HCC): ICD-10-CM

## 2018-06-08 DIAGNOSIS — J44.9 CHRONIC OBSTRUCTIVE PULMONARY DISEASE, UNSPECIFIED COPD TYPE (HCC): ICD-10-CM

## 2018-06-08 DIAGNOSIS — I10 ESSENTIAL HYPERTENSION: ICD-10-CM

## 2018-06-08 DIAGNOSIS — E03.9 ACQUIRED HYPOTHYROIDISM: ICD-10-CM

## 2018-06-08 PROCEDURE — 99214 OFFICE O/P EST MOD 30 MIN: CPT | Performed by: FAMILY MEDICINE

## 2018-06-08 RX ORDER — BISOPROLOL FUMARATE AND HYDROCHLOROTHIAZIDE 6.25; 5 MG/1; MG/1
1 TABLET ORAL DAILY
Qty: 90 TABLET | Refills: 3 | Status: SHIPPED | OUTPATIENT
Start: 2018-06-08 | End: 2019-06-18

## 2018-06-08 RX ORDER — ENALAPRIL MALEATE 10 MG/1
10 TABLET ORAL DAILY
Qty: 90 TABLET | Refills: 3 | Status: SHIPPED | OUTPATIENT
Start: 2018-06-08 | End: 2019-06-18

## 2018-06-08 RX ORDER — DILTIAZEM HYDROCHLORIDE 120 MG/1
120 CAPSULE, COATED, EXTENDED RELEASE ORAL 2 TIMES DAILY
Qty: 180 CAPSULE | Refills: 3 | Status: SHIPPED | OUTPATIENT
Start: 2018-06-08 | End: 2019-06-18

## 2018-06-08 NOTE — PROGRESS NOTES
[address]  PROGRESS NOTE  Chief Complaint:   Patient presents with: Follow - Up: 6 month check      HPI:   This is a [de-identified]year old female coming in for her diabetes follow-up. She said she has been doing well.   She has not had any probl Ur Random 138.00 mg/dL   Malb/Cre Calc 13.4 <=30.0 ug/mg   -ASSAY, THYROID STIM HORMONE   Result Value Ref Range   TSH 0.133 (L) 0.350 - 5.500 mIU/mL   -URIC ACID, SERUM   Result Value Ref Range   Uric Acid 6.8 2.4 - 8.0 mg/dL   -CBC W/ DIFFERENTIAL   Resu glargine (BASAGLAR KWIKPEN) 100 UNIT/ML Subcutaneous Solution Pen-injector Inject 10 Units into the skin daily. Disp: 10 pen Rfl: 3   bisoprolol-hydrochlorothiazide 5-6.25 MG Oral Tab Take 1 tablet by mouth daily.  Disp: 90 tablet Rfl: 3   DilTIAZem HCl ER discomfort, palpitations, edema, dyspnea on exertion or at rest.  RESPIRATORY:  Denies shortness of breath, wheezing, cough or sputum. GASTROINTESTINAL:  Denies abdominal pain, nausea, vomiting, constipation, diarrhea, or blood in stool.   MUSCULOSKELETAL: nondistended, nontender, bowel sounds normal in all 4 quadrants, no masses, no hepatosplenomegaly. BACK: No tenderness, no spasm, SLR test negative, FROM. EXTREMITIES:  No edema, no cyanosis, no clubbing, FROM, 2+ dorsalis pedis pulses bilaterally.   NEUR skin daily. bisoprolol-hydrochlorothiazide 5-6.25 MG Oral Tab 90 tablet 3      Sig: Take 1 tablet by mouth daily.       DilTIAZem HCl ER Coated Beads (CARDIZEM CD) 120 MG Oral Capsule SR 24 Hr 180 capsule 3      Sig: Take 1 capsule (120 mg total) by mo

## 2018-07-11 ENCOUNTER — TELEPHONE (OUTPATIENT)
Dept: FAMILY MEDICINE CLINIC | Facility: CLINIC | Age: 78
End: 2018-07-11

## 2018-07-11 NOTE — TELEPHONE ENCOUNTER
[facility] informed victoza is discontinued. Updated their system with this information.

## 2018-07-11 NOTE — TELEPHONE ENCOUNTER
Patient is calling to update Dr. Pate Rule on recent blood sugars after starting basaglar insulin. Patient states that since 7/3 her sugars have been: 140, 113, 124, 142, 113, 146, 135, 149, 140.      Patient is also requesting [facility] be notified Vish Levi

## 2018-11-07 ENCOUNTER — TELEPHONE (OUTPATIENT)
Dept: FAMILY MEDICINE CLINIC | Facility: CLINIC | Age: 78
End: 2018-11-07

## 2018-12-05 ENCOUNTER — LABORATORY ENCOUNTER (OUTPATIENT)
Dept: LAB | Age: 78
End: 2018-12-05
Attending: FAMILY MEDICINE
Payer: MEDICARE

## 2018-12-05 DIAGNOSIS — I48.20 CHRONIC ATRIAL FIBRILLATION (HCC): ICD-10-CM

## 2018-12-05 DIAGNOSIS — E11.9 CONTROLLED TYPE 2 DIABETES MELLITUS WITHOUT COMPLICATION, WITHOUT LONG-TERM CURRENT USE OF INSULIN (HCC): ICD-10-CM

## 2018-12-05 PROCEDURE — 36415 COLL VENOUS BLD VENIPUNCTURE: CPT

## 2018-12-05 PROCEDURE — 85025 COMPLETE CBC W/AUTO DIFF WBC: CPT

## 2018-12-05 PROCEDURE — 80053 COMPREHEN METABOLIC PANEL: CPT

## 2018-12-05 PROCEDURE — 83036 HEMOGLOBIN GLYCOSYLATED A1C: CPT

## 2018-12-11 ENCOUNTER — OFFICE VISIT (OUTPATIENT)
Dept: FAMILY MEDICINE CLINIC | Facility: CLINIC | Age: 78
End: 2018-12-11
Payer: MEDICARE

## 2018-12-11 VITALS
TEMPERATURE: 96 F | RESPIRATION RATE: 16 BRPM | SYSTOLIC BLOOD PRESSURE: 138 MMHG | BODY MASS INDEX: 27.19 KG/M2 | HEART RATE: 78 BPM | HEIGHT: 60 IN | DIASTOLIC BLOOD PRESSURE: 100 MMHG | WEIGHT: 138.5 LBS

## 2018-12-11 DIAGNOSIS — J44.9 CHRONIC OBSTRUCTIVE PULMONARY DISEASE, UNSPECIFIED COPD TYPE (HCC): ICD-10-CM

## 2018-12-11 DIAGNOSIS — Z79.4 CONTROLLED TYPE 2 DIABETES MELLITUS WITHOUT COMPLICATION, WITH LONG-TERM CURRENT USE OF INSULIN (HCC): Primary | ICD-10-CM

## 2018-12-11 DIAGNOSIS — I48.20 CHRONIC ATRIAL FIBRILLATION (HCC): ICD-10-CM

## 2018-12-11 DIAGNOSIS — E03.9 ACQUIRED HYPOTHYROIDISM: ICD-10-CM

## 2018-12-11 DIAGNOSIS — I10 ESSENTIAL HYPERTENSION: ICD-10-CM

## 2018-12-11 DIAGNOSIS — Z00.00 ENCOUNTER FOR ANNUAL HEALTH EXAMINATION: ICD-10-CM

## 2018-12-11 DIAGNOSIS — E11.9 CONTROLLED TYPE 2 DIABETES MELLITUS WITHOUT COMPLICATION, WITH LONG-TERM CURRENT USE OF INSULIN (HCC): Primary | ICD-10-CM

## 2018-12-11 PROCEDURE — G0439 PPPS, SUBSEQ VISIT: HCPCS | Performed by: FAMILY MEDICINE

## 2018-12-11 PROCEDURE — 99213 OFFICE O/P EST LOW 20 MIN: CPT | Performed by: FAMILY MEDICINE

## 2018-12-11 RX ORDER — LEVOTHYROXINE SODIUM 0.1 MG/1
100 TABLET ORAL
Qty: 90 TABLET | Refills: 3 | Status: SHIPPED | OUTPATIENT
Start: 2018-12-11 | End: 2019-11-04

## 2018-12-11 RX ORDER — BLOOD-GLUCOSE METER
KIT MISCELLANEOUS
Qty: 100 STRIP | Refills: 3 | Status: SHIPPED | OUTPATIENT
Start: 2018-12-11 | End: 2021-12-29

## 2018-12-11 NOTE — PROGRESS NOTES
[facility] [place]  PROGRESS NOTE  Chief Complaint:   Patient presents with:  Physical      HPI:   This is a [de-identified]year old female coming in for her annual wellness visit. She said that she has been doing very well.   She did have a little bit of p 46.3 fL    Neutrophil Absolute Prelim 4.54 1.30 - 6.70 x10 (3) uL    Neutrophil Absolute 4.54 1.30 - 6.70 x10(3) uL    Lymphocyte Absolute 1.70 0.90 - 4.00 x10(3) uL    Monocyte Absolute 0.62 0.10 - 1.00 x10(3) uL    Eosinophil Absolute 0.38 (H) 0.00 - 0.3 tablet Rfl: 3   Enalapril Maleate 10 MG Oral Tab Take 1 tablet (10 mg total) by mouth daily. Disp: 90 tablet Rfl: 3   Insulin Pen Needle (BD PEN NEEDLE SHORT U/F) 31G X 8 MM Does not apply Misc Inject 1 each into the skin daily.  Use with Victoza Disp: 90 e anemia, bleeding or bruising. LYMPHATICS:  Denies enlarged nodes or history of splenectomy. PSYCHIATRIC:  Denies depression or anxiety. ENDOCRINOLOGIC: She gets occasional low blood sugars right before lunch if she does not need a mid morning snack.   AL lower legs/feet is normal as well. ASSESSMENT AND PLAN:   1. Controlled type 2 diabetes mellitus without complication, without long-term current use of insulin ([address]  She has type 2 diabetes without complications. She is doing well.     2. Acquired hy disease) ([facility]     Diabetes mellitus type II, controlled ([facility]     History of colonic polyps     Familial multiple lipoprotein-type hyperlipidemia     Essential hypertension     Hypothyroidism     Paroxysmal atrial fibrillation ([facility]      Elmer Saucedo,

## 2018-12-11 NOTE — PATIENT INSTRUCTIONS
Recommended Websites for Advanced Directives    SeekAlumni.no. org/publications/Documents/personal_dec. pdf  An information packet, including necessary form from the [address] website. http://[email or web address] [place]state. [place]/public/books/adv

## 2019-01-18 ENCOUNTER — MED REC SCAN ONLY (OUTPATIENT)
Dept: FAMILY MEDICINE CLINIC | Facility: CLINIC | Age: 79
End: 2019-01-18

## 2019-02-04 ENCOUNTER — TELEPHONE (OUTPATIENT)
Dept: FAMILY MEDICINE CLINIC | Facility: CLINIC | Age: 79
End: 2019-02-04

## 2019-02-04 NOTE — TELEPHONE ENCOUNTER
Future appt:     Your appointments     Date & Time Appointment Department [facility])    Jun 06, 2019  8:00 AM CDT Laboratory Visit with REF Narendra Alcantar [facility] ([facility])    Jun 11, 2019  9:30 AM CDT Follow up with Pat Staley MD

## 2019-02-22 ENCOUNTER — OFFICE VISIT (OUTPATIENT)
Dept: FAMILY MEDICINE CLINIC | Facility: CLINIC | Age: 79
End: 2019-02-22
Payer: MEDICARE

## 2019-02-22 ENCOUNTER — TELEPHONE (OUTPATIENT)
Dept: FAMILY MEDICINE CLINIC | Facility: CLINIC | Age: 79
End: 2019-02-22

## 2019-02-22 VITALS
HEIGHT: 60 IN | TEMPERATURE: 98 F | RESPIRATION RATE: 18 BRPM | OXYGEN SATURATION: 92 % | SYSTOLIC BLOOD PRESSURE: 110 MMHG | BODY MASS INDEX: 27.92 KG/M2 | HEART RATE: 82 BPM | DIASTOLIC BLOOD PRESSURE: 68 MMHG | WEIGHT: 142.19 LBS

## 2019-02-22 DIAGNOSIS — J01.00 ACUTE NON-RECURRENT MAXILLARY SINUSITIS: ICD-10-CM

## 2019-02-22 DIAGNOSIS — J20.9 ACUTE BRONCHITIS, UNSPECIFIED ORGANISM: Primary | ICD-10-CM

## 2019-02-22 PROCEDURE — 99214 OFFICE O/P EST MOD 30 MIN: CPT | Performed by: NURSE PRACTITIONER

## 2019-02-22 RX ORDER — PROMETHAZINE HYDROCHLORIDE AND CODEINE PHOSPHATE 6.25; 1 MG/5ML; MG/5ML
2.5 SYRUP ORAL EVERY 4 HOURS PRN
Qty: 240 ML | Refills: 0 | Status: SHIPPED | OUTPATIENT
Start: 2019-02-22 | End: 2020-06-23 | Stop reason: ALTCHOICE

## 2019-02-22 RX ORDER — AMOXICILLIN AND CLAVULANATE POTASSIUM 875; 125 MG/1; MG/1
1 TABLET, FILM COATED ORAL 2 TIMES DAILY
Qty: 20 TABLET | Refills: 0 | Status: SHIPPED | OUTPATIENT
Start: 2019-02-22 | End: 2019-03-04

## 2019-02-22 NOTE — TELEPHONE ENCOUNTER
Patient states she is having bronchitis sx. Would like treated. Appt given today 1:45 with Lisa MITCHELL for evaluation of sx.   Morena Connolly, 02/22/19, 9:04 AM

## 2019-02-22 NOTE — PATIENT INSTRUCTIONS
Rest, increase fluids, salt water gargles ,jose pot (use distilled water) or saline nasal spray, Robitussin-DM or Mucinex-DM, Tylenol/Ibuprofen, follow up if symptoms persist or increase.

## 2019-02-22 NOTE — PROGRESS NOTES
CHIEF COMPLAINT:   Patient presents with:  Cough: coughing for a week      HPI:   eMg Barnard is a [de-identified]year old female who presents to clinic today with complaints of cough- has had it for a week- states she uses promethazine with codeine- is all out Victoza Disp: 90 each Rfl: 3   Albuterol Sulfate  (90 Base) MCG/ACT Inhalation Aero Soln Inhale 2 puffs into the lungs every 6 (six) hours as needed for Wheezing. Disp: 1 Inhaler Rfl: 0   Glucose Blood In Vitro Strip 1x daily glucose testing.   Dx: lesions  ASSESSMENT AND PLAN:   Megan Hartmann is a [de-identified]year old female who presents with ear problems symptoms are consistent with  ASSESSMENT:  Acute bronchitis, unspecified organism  (primary encounter diagnosis)  Acute non-recurrent maxillary sinusiti

## 2019-06-03 ENCOUNTER — TELEPHONE (OUTPATIENT)
Dept: FAMILY MEDICINE CLINIC | Facility: CLINIC | Age: 79
End: 2019-06-03

## 2019-06-03 DIAGNOSIS — E78.49 FAMILIAL MULTIPLE LIPOPROTEIN-TYPE HYPERLIPIDEMIA: ICD-10-CM

## 2019-06-03 DIAGNOSIS — E03.9 ACQUIRED HYPOTHYROIDISM: ICD-10-CM

## 2019-06-03 DIAGNOSIS — Z79.4 CONTROLLED TYPE 2 DIABETES MELLITUS WITHOUT COMPLICATION, WITH LONG-TERM CURRENT USE OF INSULIN (HCC): Primary | ICD-10-CM

## 2019-06-03 DIAGNOSIS — I10 ESSENTIAL HYPERTENSION: ICD-10-CM

## 2019-06-03 DIAGNOSIS — E11.9 CONTROLLED TYPE 2 DIABETES MELLITUS WITHOUT COMPLICATION, WITH LONG-TERM CURRENT USE OF INSULIN (HCC): Primary | ICD-10-CM

## 2019-06-03 NOTE — TELEPHONE ENCOUNTER
----- Message from Cedric Haile sent at 6/3/2019  1:16 PM CDT -----  Regarding: lab orders needed   Patient has lab appointment. Could you please put lab orders in system.         Thanks,  Humaira

## 2019-06-06 ENCOUNTER — LABORATORY ENCOUNTER (OUTPATIENT)
Dept: LAB | Age: 79
End: 2019-06-06
Attending: FAMILY MEDICINE
Payer: MEDICARE

## 2019-06-06 DIAGNOSIS — E11.9 CONTROLLED TYPE 2 DIABETES MELLITUS WITHOUT COMPLICATION, WITH LONG-TERM CURRENT USE OF INSULIN (HCC): ICD-10-CM

## 2019-06-06 DIAGNOSIS — E03.9 ACQUIRED HYPOTHYROIDISM: ICD-10-CM

## 2019-06-06 DIAGNOSIS — Z79.4 CONTROLLED TYPE 2 DIABETES MELLITUS WITHOUT COMPLICATION, WITH LONG-TERM CURRENT USE OF INSULIN (HCC): ICD-10-CM

## 2019-06-06 DIAGNOSIS — I10 ESSENTIAL HYPERTENSION: ICD-10-CM

## 2019-06-06 DIAGNOSIS — E78.49 FAMILIAL MULTIPLE LIPOPROTEIN-TYPE HYPERLIPIDEMIA: ICD-10-CM

## 2019-06-06 PROCEDURE — 80053 COMPREHEN METABOLIC PANEL: CPT

## 2019-06-06 PROCEDURE — 80061 LIPID PANEL: CPT

## 2019-06-06 PROCEDURE — 83036 HEMOGLOBIN GLYCOSYLATED A1C: CPT

## 2019-06-06 PROCEDURE — 82570 ASSAY OF URINE CREATININE: CPT

## 2019-06-06 PROCEDURE — 85025 COMPLETE CBC W/AUTO DIFF WBC: CPT

## 2019-06-06 PROCEDURE — 36415 COLL VENOUS BLD VENIPUNCTURE: CPT

## 2019-06-06 PROCEDURE — 84550 ASSAY OF BLOOD/URIC ACID: CPT

## 2019-06-06 PROCEDURE — 84443 ASSAY THYROID STIM HORMONE: CPT

## 2019-06-06 PROCEDURE — 82043 UR ALBUMIN QUANTITATIVE: CPT

## 2019-06-18 ENCOUNTER — OFFICE VISIT (OUTPATIENT)
Dept: FAMILY MEDICINE CLINIC | Facility: CLINIC | Age: 79
End: 2019-06-18
Payer: MEDICARE

## 2019-06-18 VITALS
RESPIRATION RATE: 14 BRPM | BODY MASS INDEX: 26.9 KG/M2 | DIASTOLIC BLOOD PRESSURE: 86 MMHG | HEART RATE: 68 BPM | WEIGHT: 137 LBS | HEIGHT: 60 IN | TEMPERATURE: 97 F | SYSTOLIC BLOOD PRESSURE: 126 MMHG

## 2019-06-18 DIAGNOSIS — I10 ESSENTIAL HYPERTENSION: ICD-10-CM

## 2019-06-18 DIAGNOSIS — J41.0 SIMPLE CHRONIC BRONCHITIS (HCC): ICD-10-CM

## 2019-06-18 DIAGNOSIS — E11.9 CONTROLLED TYPE 2 DIABETES MELLITUS WITHOUT COMPLICATION, WITH LONG-TERM CURRENT USE OF INSULIN (HCC): Primary | ICD-10-CM

## 2019-06-18 DIAGNOSIS — I48.0 PAROXYSMAL ATRIAL FIBRILLATION (HCC): ICD-10-CM

## 2019-06-18 DIAGNOSIS — Z79.4 CONTROLLED TYPE 2 DIABETES MELLITUS WITHOUT COMPLICATION, WITH LONG-TERM CURRENT USE OF INSULIN (HCC): Primary | ICD-10-CM

## 2019-06-18 DIAGNOSIS — I48.20 CHRONIC ATRIAL FIBRILLATION (HCC): ICD-10-CM

## 2019-06-18 DIAGNOSIS — E78.49 FAMILIAL MULTIPLE LIPOPROTEIN-TYPE HYPERLIPIDEMIA: ICD-10-CM

## 2019-06-18 DIAGNOSIS — E03.9 ACQUIRED HYPOTHYROIDISM: ICD-10-CM

## 2019-06-18 DIAGNOSIS — Z78.0 POSTMENOPAUSAL: ICD-10-CM

## 2019-06-18 PROCEDURE — 99214 OFFICE O/P EST MOD 30 MIN: CPT | Performed by: FAMILY MEDICINE

## 2019-06-18 RX ORDER — DILTIAZEM HYDROCHLORIDE 120 MG/1
120 CAPSULE, COATED, EXTENDED RELEASE ORAL 2 TIMES DAILY
Qty: 180 CAPSULE | Refills: 1 | Status: SHIPPED | OUTPATIENT
Start: 2019-06-18 | End: 2019-11-04

## 2019-06-18 RX ORDER — ENALAPRIL MALEATE 10 MG/1
10 TABLET ORAL DAILY
Qty: 90 TABLET | Refills: 1 | Status: SHIPPED | OUTPATIENT
Start: 2019-06-18 | End: 2019-11-04

## 2019-06-18 RX ORDER — BISOPROLOL FUMARATE AND HYDROCHLOROTHIAZIDE 6.25; 5 MG/1; MG/1
1 TABLET ORAL DAILY
Qty: 90 TABLET | Refills: 1 | Status: SHIPPED | OUTPATIENT
Start: 2019-06-18 | End: 2019-11-04

## 2019-06-18 NOTE — PROGRESS NOTES
[address]  PROGRESS NOTE  Chief Complaint:   Patient presents with: Follow - Up  Diabetes      HPI:   This is a 78year old female coming in for up on her diabetes. She said that her blood sugars have been doing well.   She does have o ASSAY, THYROID STIM HORMONE   Result Value Ref Range    TSH 0.228 (L) 0.358 - 3.740 mIU/mL   URIC ACID, SERUM   Result Value Ref Range    Uric Acid 7.2 (H) 2.6 - 6.0 mg/dL   MICROALB/CREAT RATIO, RANDOM URINE   Result Value Ref Range    Microalbumin, Romeo Hardy reaction(s): severe muscle aches  Sulfa Antibiotics           Comment:Other reaction(s): developed rash  Current Meds:    Current Outpatient Medications:  Insulin Pen Needle (BD PEN NEEDLE SHORT U/F) 31G X 8 MM Does not apply Misc Inject 1 each into the sk loss, blurred vision, double vision or yellow sclerae. Ears, Nose, Throat:  Denies hearing loss, sneezing, congestion, runny nose or sore throat. INTEGUMENTARY:  Denies rashes, itching, skin lesion, or excessive skin dryness.   CARDIOVASCULAR:  Denies ches CLAD, no JVD, no thyromegaly. SKIN: No rashes, no skin lesion, no bruising, good turgor. HEART: Irregularly irregular S1-S2 without murmur. LUNGS: Good air entry with an occasional wheeze.   ABDOMEN:  Soft, nondistended, nontender, bowel sounds normal in • insulin glargine (BASAGLAR KWIKPEN) 100 UNIT/ML Subcutaneous Solution Pen-injector 10 pen 1     Sig: Inject 10 Units into the skin daily. • Enalapril Maleate 10 MG Oral Tab 90 tablet 1     Sig: Take 1 tablet (10 mg total) by mouth daily.    • dilTIAZe

## 2019-06-20 ENCOUNTER — HOSPITAL ENCOUNTER (OUTPATIENT)
Dept: BONE DENSITY | Age: 79
Discharge: HOME | End: 2019-06-20
Attending: FAMILY MEDICINE
Payer: MEDICARE

## 2019-06-20 DIAGNOSIS — Z78.0 POSTMENOPAUSAL: ICD-10-CM

## 2019-06-20 PROCEDURE — 77080 DXA BONE DENSITY AXIAL: CPT | Performed by: FAMILY MEDICINE

## 2019-06-24 ENCOUNTER — TELEPHONE (OUTPATIENT)
Dept: FAMILY MEDICINE CLINIC | Facility: CLINIC | Age: 79
End: 2019-06-24

## 2019-06-24 NOTE — TELEPHONE ENCOUNTER
----- Message from Nuris Metz MD sent at 6/21/2019  4:33 PM CDT -----  Please call Darrion Celis. Her bone density shows osteopenia. She has some loss of bone density in her hip. The treatment is weightbearing exercise and vitamin D supplementation.   I do

## 2019-10-16 ENCOUNTER — TELEPHONE (OUTPATIENT)
Dept: FAMILY MEDICINE CLINIC | Facility: CLINIC | Age: 79
End: 2019-10-16

## 2019-10-16 NOTE — TELEPHONE ENCOUNTER
having catarac surgery on 11-12, needs pre op physical and there was nothing to offer with dr Maggie Combs and she wants to talk to you

## 2019-10-16 NOTE — TELEPHONE ENCOUNTER
Appt for PreOp given. Peyton Shrestha, 10/16/19, 12:52 PM    Future appt:     Your appointments     Date & Time Appointment Department [facility])    Nov 04, 2019  1:30 PM CST Presurgical Visit with Moisés Doty MD [address] [place] [address]

## 2019-11-04 ENCOUNTER — OFFICE VISIT (OUTPATIENT)
Dept: FAMILY MEDICINE CLINIC | Facility: CLINIC | Age: 79
End: 2019-11-04
Payer: MEDICARE

## 2019-11-04 VITALS
TEMPERATURE: 98 F | HEIGHT: 60 IN | RESPIRATION RATE: 18 BRPM | HEART RATE: 95 BPM | OXYGEN SATURATION: 95 % | SYSTOLIC BLOOD PRESSURE: 130 MMHG | BODY MASS INDEX: 27.22 KG/M2 | WEIGHT: 138.63 LBS | DIASTOLIC BLOOD PRESSURE: 80 MMHG

## 2019-11-04 DIAGNOSIS — I10 ESSENTIAL HYPERTENSION: ICD-10-CM

## 2019-11-04 DIAGNOSIS — I48.0 PAROXYSMAL ATRIAL FIBRILLATION (HCC): ICD-10-CM

## 2019-11-04 DIAGNOSIS — E03.9 ACQUIRED HYPOTHYROIDISM: ICD-10-CM

## 2019-11-04 DIAGNOSIS — H25.13 AGE-RELATED NUCLEAR CATARACT OF BOTH EYES: Primary | ICD-10-CM

## 2019-11-04 DIAGNOSIS — Z79.4 CONTROLLED TYPE 2 DIABETES MELLITUS WITHOUT COMPLICATION, WITH LONG-TERM CURRENT USE OF INSULIN (HCC): ICD-10-CM

## 2019-11-04 DIAGNOSIS — Z01.818 PREOP EXAMINATION: ICD-10-CM

## 2019-11-04 DIAGNOSIS — E78.49 FAMILIAL MULTIPLE LIPOPROTEIN-TYPE HYPERLIPIDEMIA: ICD-10-CM

## 2019-11-04 DIAGNOSIS — E11.9 CONTROLLED TYPE 2 DIABETES MELLITUS WITHOUT COMPLICATION, WITH LONG-TERM CURRENT USE OF INSULIN (HCC): ICD-10-CM

## 2019-11-04 DIAGNOSIS — Z23 NEED FOR VACCINATION: ICD-10-CM

## 2019-11-04 PROCEDURE — 93000 ELECTROCARDIOGRAM COMPLETE: CPT | Performed by: FAMILY MEDICINE

## 2019-11-04 PROCEDURE — G0008 ADMIN INFLUENZA VIRUS VAC: HCPCS | Performed by: FAMILY MEDICINE

## 2019-11-04 PROCEDURE — 90662 IIV NO PRSV INCREASED AG IM: CPT | Performed by: FAMILY MEDICINE

## 2019-11-04 PROCEDURE — 99214 OFFICE O/P EST MOD 30 MIN: CPT | Performed by: FAMILY MEDICINE

## 2019-11-04 RX ORDER — BISOPROLOL FUMARATE AND HYDROCHLOROTHIAZIDE 6.25; 5 MG/1; MG/1
1 TABLET ORAL DAILY
Qty: 90 TABLET | Refills: 1 | Status: SHIPPED | OUTPATIENT
Start: 2019-11-04 | End: 2020-06-23

## 2019-11-04 RX ORDER — ENALAPRIL MALEATE 10 MG/1
10 TABLET ORAL DAILY
Qty: 90 TABLET | Refills: 1 | Status: SHIPPED | OUTPATIENT
Start: 2019-11-04 | End: 2020-06-23

## 2019-11-04 RX ORDER — TRIPROLIDINE/PSEUDOEPHEDRINE 2.5MG-60MG
TABLET ORAL
Refills: 1 | COMMUNITY
Start: 2019-10-18 | End: 2020-06-23 | Stop reason: ALTCHOICE

## 2019-11-04 RX ORDER — MOXIFLOXACIN 5 MG/ML
SOLUTION/ DROPS OPHTHALMIC
Refills: 1 | COMMUNITY
Start: 2019-10-18 | End: 2020-06-23 | Stop reason: ALTCHOICE

## 2019-11-04 RX ORDER — LEVOTHYROXINE SODIUM 0.1 MG/1
100 TABLET ORAL
Qty: 90 TABLET | Refills: 1 | Status: SHIPPED | OUTPATIENT
Start: 2019-11-04 | End: 2020-06-23

## 2019-11-04 RX ORDER — DILTIAZEM HYDROCHLORIDE 120 MG/1
120 CAPSULE, COATED, EXTENDED RELEASE ORAL 2 TIMES DAILY
Qty: 180 CAPSULE | Refills: 1 | Status: SHIPPED | OUTPATIENT
Start: 2019-11-04 | End: 2020-06-23

## 2019-11-04 NOTE — PROGRESS NOTES
[facility] [place]  PROGRESS NOTE  Chief Complaint:   Patient presents with:  Pre-Op Exam      HPI:   This is a 78year old female coming in for a preoperative examination. She is scheduled for bilateral cataract extraction.   The first one is HEMOGLOBIN A1C   Result Value Ref Range    HgbA1C 7.4 (H) <5.7 %    Estimated Average Glucose 166 (H) 68 - 126 mg/dL   LIPID PANEL   Result Value Ref Range    Cholesterol, Total 189 <200 mg/dL    HDL Cholesterol 68 (H) 40 - 59 mg/dL    Triglycerides 69 3 Date   • COLONOSCOPY  2015   • HYSTERECTOMY  2004   • TONSILLECTOMY       Social History:  Social History    Tobacco Use      Smoking status: Never Smoker      Smokeless tobacco: Never Used    Alcohol use: No      Alcohol/week: 0.0 standard drinks    Drug DX  E11.9 100 strip 3   • Albuterol Sulfate  (90 Base) MCG/ACT Inhalation Aero Soln Inhale 2 puffs into the lungs every 6 (six) hours as needed for Wheezing. 1 Inhaler 0   • Glucose Blood In Vitro Strip 1x daily glucose testing.   Dx:  E11.9  Non Ins bilaterally, no eye discharge Ears: External normal. Nose: patent, no nasal discharge Throat:  No tonsillar erythema or exudate. Mouth:  No oral lesions or ulcerations, good dentition. NECK: Supple, no CLAD, no JVD, no thyromegaly.   SKIN: No rashes, no s Take 1 tablet by mouth daily. • metFORMIN HCl 1000 MG Oral Tab 180 tablet 1     Sig: Take 1 tablet (1,000 mg total) by mouth 2 (two) times daily with meals.    • dilTIAZem HCl ER Coated Beads (CARDIZEM CD) 120 MG Oral Capsule SR 24 Hr 180 capsule 1     Si

## 2019-12-10 ENCOUNTER — TELEPHONE (OUTPATIENT)
Dept: FAMILY MEDICINE CLINIC | Facility: CLINIC | Age: 79
End: 2019-12-10

## 2019-12-10 DIAGNOSIS — Z79.4 CONTROLLED TYPE 2 DIABETES MELLITUS WITHOUT COMPLICATION, WITH LONG-TERM CURRENT USE OF INSULIN (HCC): ICD-10-CM

## 2019-12-10 DIAGNOSIS — E11.9 CONTROLLED TYPE 2 DIABETES MELLITUS WITHOUT COMPLICATION, WITH LONG-TERM CURRENT USE OF INSULIN (HCC): ICD-10-CM

## 2019-12-10 DIAGNOSIS — E78.49 FAMILIAL MULTIPLE LIPOPROTEIN-TYPE HYPERLIPIDEMIA: Primary | ICD-10-CM

## 2019-12-13 ENCOUNTER — APPOINTMENT (OUTPATIENT)
Dept: LAB | Age: 79
End: 2019-12-13
Attending: FAMILY MEDICINE
Payer: MEDICARE

## 2019-12-13 DIAGNOSIS — Z79.4 CONTROLLED TYPE 2 DIABETES MELLITUS WITHOUT COMPLICATION, WITH LONG-TERM CURRENT USE OF INSULIN (HCC): ICD-10-CM

## 2019-12-13 DIAGNOSIS — E11.9 CONTROLLED TYPE 2 DIABETES MELLITUS WITHOUT COMPLICATION, WITH LONG-TERM CURRENT USE OF INSULIN (HCC): ICD-10-CM

## 2019-12-13 DIAGNOSIS — E78.49 FAMILIAL MULTIPLE LIPOPROTEIN-TYPE HYPERLIPIDEMIA: ICD-10-CM

## 2019-12-13 PROCEDURE — 80053 COMPREHEN METABOLIC PANEL: CPT

## 2019-12-13 PROCEDURE — 36415 COLL VENOUS BLD VENIPUNCTURE: CPT

## 2019-12-13 PROCEDURE — 83036 HEMOGLOBIN GLYCOSYLATED A1C: CPT

## 2019-12-18 ENCOUNTER — OFFICE VISIT (OUTPATIENT)
Dept: FAMILY MEDICINE CLINIC | Facility: CLINIC | Age: 79
End: 2019-12-18
Payer: MEDICARE

## 2019-12-18 VITALS
WEIGHT: 141.81 LBS | HEIGHT: 60 IN | BODY MASS INDEX: 27.84 KG/M2 | DIASTOLIC BLOOD PRESSURE: 80 MMHG | RESPIRATION RATE: 20 BRPM | HEART RATE: 100 BPM | TEMPERATURE: 99 F | SYSTOLIC BLOOD PRESSURE: 136 MMHG

## 2019-12-18 DIAGNOSIS — E11.9 CONTROLLED TYPE 2 DIABETES MELLITUS WITHOUT COMPLICATION, WITH LONG-TERM CURRENT USE OF INSULIN (HCC): ICD-10-CM

## 2019-12-18 DIAGNOSIS — E03.9 ACQUIRED HYPOTHYROIDISM: ICD-10-CM

## 2019-12-18 DIAGNOSIS — Z00.00 ENCOUNTER FOR ANNUAL HEALTH EXAMINATION: Primary | ICD-10-CM

## 2019-12-18 DIAGNOSIS — E78.49 FAMILIAL MULTIPLE LIPOPROTEIN-TYPE HYPERLIPIDEMIA: ICD-10-CM

## 2019-12-18 DIAGNOSIS — J41.0 SIMPLE CHRONIC BRONCHITIS (HCC): ICD-10-CM

## 2019-12-18 DIAGNOSIS — I10 ESSENTIAL HYPERTENSION: ICD-10-CM

## 2019-12-18 DIAGNOSIS — Z79.4 CONTROLLED TYPE 2 DIABETES MELLITUS WITHOUT COMPLICATION, WITH LONG-TERM CURRENT USE OF INSULIN (HCC): ICD-10-CM

## 2019-12-18 DIAGNOSIS — I48.0 PAROXYSMAL ATRIAL FIBRILLATION (HCC): ICD-10-CM

## 2019-12-18 PROBLEM — H25.13 AGE-RELATED NUCLEAR CATARACT OF BOTH EYES: Status: RESOLVED | Noted: 2019-11-04 | Resolved: 2019-12-18

## 2019-12-18 PROCEDURE — 99213 OFFICE O/P EST LOW 20 MIN: CPT | Performed by: FAMILY MEDICINE

## 2019-12-18 PROCEDURE — G0439 PPPS, SUBSEQ VISIT: HCPCS | Performed by: FAMILY MEDICINE

## 2019-12-18 RX ORDER — KETOROLAC TROMETHAMINE 5 MG/ML
1 SOLUTION OPHTHALMIC 4 TIMES DAILY
Refills: 1 | COMMUNITY
Start: 2019-12-02 | End: 2020-06-23 | Stop reason: ALTCHOICE

## 2019-12-18 RX ORDER — PREDNISOLONE ACETATE 10 MG/ML
1 SUSPENSION/ DROPS OPHTHALMIC AS DIRECTED
COMMUNITY
Start: 2019-12-16 | End: 2020-06-23 | Stop reason: ALTCHOICE

## 2019-12-18 NOTE — PROGRESS NOTES
[place] [facility] [place]  PROGRESS NOTE  Chief Complaint:   Patient presents with:  Physical      HPI:   This is a 78year old female coming in for her annual wellness exam.    She said that her diabetes has been doing great.   With the current medicat <5.7 %    Estimated Average Glucose 169 (H) 68 - 126 mg/dL       Past Medical History:   Diagnosis Date   • Age-related nuclear cataract of both eyes 11/4/2019   • Atrial fibrillation (HCC)     Intermittent   • COPD (chronic obstructive pulmonary disease) capsule 1   • Levothyroxine Sodium 100 MCG Oral Tab Take 1 tablet (100 mcg total) by mouth before breakfast. 90 tablet 1   • Insulin Pen Needle (BD PEN NEEDLE SHORT U/F) 31G X 8 MM Does not apply Misc Inject 1 each into the skin daily.  90 each 3   • insuli joint pain, swelling or stiffness. NEUROLOGICAL:  Denies headache, seizures, dizziness, syncope, paralysis, ataxia, numbness or tingling in the extremities,change in bowel or bladder control. HEMATOLOGIC:  Denies anemia, bleeding or bruising.   LYMPHATICS bilaterally. NEURO:  No deficit, normal gait, strength and tone, sensory intact.   Bilateral barefoot skin diabetic exam is normal, visualized feet and the appearance is normal.  Bilateral monofilament/sensation of both feet is normal.  Pulsation pedal pul education done. Outcome: Patient verbalizes understanding. Patient is notified to call with any questions, complications, allergies, or worsening or changing symptoms.   Patient is to call with any side effects or complications from the treatments as a re

## 2019-12-18 NOTE — PATIENT INSTRUCTIONS
Continue the same medications. Recheck in 6 months. Recommended Websites for Advanced Directives    [email or web address] org/publications/Documents/personal_[email or web address]. pdf  An information packet, including necessary form from the [facility]

## 2019-12-30 ENCOUNTER — TELEPHONE (OUTPATIENT)
Dept: FAMILY MEDICINE CLINIC | Facility: CLINIC | Age: 79
End: 2019-12-30

## 2020-01-03 ENCOUNTER — OFFICE VISIT (OUTPATIENT)
Dept: FAMILY MEDICINE CLINIC | Facility: CLINIC | Age: 80
End: 2020-01-03
Payer: MEDICARE

## 2020-01-03 VITALS
WEIGHT: 141.81 LBS | RESPIRATION RATE: 16 BRPM | TEMPERATURE: 99 F | DIASTOLIC BLOOD PRESSURE: 80 MMHG | BODY MASS INDEX: 27.84 KG/M2 | HEIGHT: 60 IN | HEART RATE: 96 BPM | SYSTOLIC BLOOD PRESSURE: 132 MMHG

## 2020-01-03 DIAGNOSIS — I48.0 PAROXYSMAL ATRIAL FIBRILLATION (HCC): ICD-10-CM

## 2020-01-03 DIAGNOSIS — E11.9 CONTROLLED TYPE 2 DIABETES MELLITUS WITHOUT COMPLICATION, WITH LONG-TERM CURRENT USE OF INSULIN (HCC): ICD-10-CM

## 2020-01-03 DIAGNOSIS — J90 BILATERAL PLEURAL EFFUSION: ICD-10-CM

## 2020-01-03 DIAGNOSIS — E03.9 ACQUIRED HYPOTHYROIDISM: ICD-10-CM

## 2020-01-03 DIAGNOSIS — J41.0 SIMPLE CHRONIC BRONCHITIS (HCC): ICD-10-CM

## 2020-01-03 DIAGNOSIS — I10 ESSENTIAL HYPERTENSION: Primary | ICD-10-CM

## 2020-01-03 DIAGNOSIS — Z79.4 CONTROLLED TYPE 2 DIABETES MELLITUS WITHOUT COMPLICATION, WITH LONG-TERM CURRENT USE OF INSULIN (HCC): ICD-10-CM

## 2020-01-03 PROCEDURE — 1111F DSCHRG MED/CURRENT MED MERGE: CPT | Performed by: FAMILY MEDICINE

## 2020-01-03 PROCEDURE — 99214 OFFICE O/P EST MOD 30 MIN: CPT | Performed by: FAMILY MEDICINE

## 2020-01-03 RX ORDER — PROPAFENONE HYDROCHLORIDE 150 MG/1
150 TABLET, FILM COATED ORAL 3 TIMES DAILY
COMMUNITY
Start: 2019-12-28 | End: 2020-02-03

## 2020-01-03 NOTE — PROGRESS NOTES
[facility]  [facility]  Chief Complaint:   Patient presents with:  Hospital F/U      HPI:   This is a 78year old female coming in for follow-up after a hospitalization.   She was admitted to [facility] 12/27/2019 with bilater Globulin  3.5 2.8 - 4.4 g/dL    A/G Ratio 1.0 1.0 - 2.0    FASTING Yes    HEMOGLOBIN A1C   Result Value Ref Range    HgbA1C 7.5 (H) <5.7 %    Estimated Average Glucose 169 (H) 68 - 126 mg/dL       Past Medical History:   Diagnosis Date   • Age-related n tablet 1   • metFORMIN HCl 1000 MG Oral Tab Take 1 tablet (1,000 mg total) by mouth 2 (two) times daily with meals.  180 tablet 1   • dilTIAZem HCl ER Coated Beads (CARDIZEM CD) 120 MG Oral Capsule SR 24 Hr Take 1 capsule (120 mg total) by mouth 2 (two) danna nausea, vomiting, constipation, diarrhea, or blood in stool. MUSCULOSKELETAL:  Denies weakness, muscle aches, back pain, joint pain, swelling or stiffness.   NEUROLOGICAL:  Denies headache, seizures, dizziness, syncope, paralysis, ataxia, numbness or tingl deficit, normal gait, strength and tone, sensory intact, normal reflexes. ASSESSMENT AND PLAN:   1. Essential hypertension  Her blood pressure is mildly elevated but no changes recommended now on her blood pressure medicines.     2. Simple chronic bronch long-term current use of insulin (HCC)     History of colonic polyps     Familial multiple lipoprotein-type hyperlipidemia     Essential hypertension     Hypothyroidism     Preop examination     Bilateral pleural effusion      Whitney Escudero MD  1/3/202

## 2020-02-03 ENCOUNTER — OFFICE VISIT (OUTPATIENT)
Dept: FAMILY MEDICINE CLINIC | Facility: CLINIC | Age: 80
End: 2020-02-03
Payer: MEDICARE

## 2020-02-03 VITALS
TEMPERATURE: 98 F | HEART RATE: 92 BPM | SYSTOLIC BLOOD PRESSURE: 144 MMHG | DIASTOLIC BLOOD PRESSURE: 98 MMHG | BODY MASS INDEX: 28.04 KG/M2 | HEIGHT: 60 IN | RESPIRATION RATE: 16 BRPM | WEIGHT: 142.81 LBS

## 2020-02-03 DIAGNOSIS — I10 ESSENTIAL HYPERTENSION: ICD-10-CM

## 2020-02-03 DIAGNOSIS — I50.89 OTHER HEART FAILURE (HCC): ICD-10-CM

## 2020-02-03 DIAGNOSIS — I48.0 PAROXYSMAL ATRIAL FIBRILLATION (HCC): Primary | ICD-10-CM

## 2020-02-03 DIAGNOSIS — E11.9 TYPE 2 DIABETES MELLITUS WITHOUT COMPLICATION, WITHOUT LONG-TERM CURRENT USE OF INSULIN (HCC): ICD-10-CM

## 2020-02-03 PROBLEM — Z79.02 ENCOUNTER FOR CURRENT LONG TERM USE OF ANTITHROMBOTIC DRUG: Status: ACTIVE | Noted: 2020-01-17

## 2020-02-03 PROBLEM — Z79.899 ENCOUNTER FOR LONG-TERM (CURRENT) USE OF MEDICATIONS: Status: ACTIVE | Noted: 2020-01-17

## 2020-02-03 PROCEDURE — 99213 OFFICE O/P EST LOW 20 MIN: CPT | Performed by: FAMILY MEDICINE

## 2020-02-03 RX ORDER — POTASSIUM CHLORIDE 750 MG/1
10 TABLET, FILM COATED, EXTENDED RELEASE ORAL DAILY
COMMUNITY
Start: 2020-01-17 | End: 2020-06-05 | Stop reason: DRUGHIGH

## 2020-02-03 RX ORDER — FUROSEMIDE 40 MG/1
1 TABLET ORAL DAILY
COMMUNITY
Start: 2020-01-17 | End: 2020-12-21

## 2020-02-03 NOTE — PROGRESS NOTES
[address]  PROGRESS NOTE  Chief Complaint:   Patient presents with: Follow - Up: Bisoprolol/HCTZ Stopped????      HPI:   This is a 78year old female coming in for follow-up on her medication.     She went to see Dr. Everett Slice a couple wee Negative   • Hyperlipidemia    • Sick sinus syndrome [facility])      Past Surgical History:   Procedure Laterality Date   • CATARACT     • COLONOSCOPY  2015   • HYSTERECTOMY  2004   • TONSILLECTOMY       Social History:  Social History    Tobacco Use      Smoki CD) 120 MG Oral Capsule SR 24 Hr Take 1 capsule (120 mg total) by mouth 2 (two) times daily.  180 capsule 1   • Levothyroxine Sodium 100 MCG Oral Tab Take 1 tablet (100 mcg total) by mouth before breakfast. 90 tablet 1   • Insulin Pen Needle (BD PEN NEEDLE Position: Sitting)   Pulse 92   Temp 98.4 °F (36.9 °C) (Tympanic)   Resp 16   Ht 60\"   Wt 142 lb 12.8 oz (64.8 kg)   BMI 27.89 kg/m²  Estimated body mass index is 27.89 kg/m² as calculated from the following:    Height as of this encounter: 60\".     Weigh are no barriers to learning. Medical education done. Outcome: Patient verbalizes understanding. Patient is notified to call with any questions, complications, allergies, or worsening or changing symptoms.   Patient is to call with any side effects or comp

## 2020-03-24 ENCOUNTER — LABORATORY ENCOUNTER (OUTPATIENT)
Dept: LAB | Age: 80
End: 2020-03-24
Attending: FAMILY MEDICINE
Payer: MEDICARE

## 2020-03-24 DIAGNOSIS — Z79.899 LONG TERM CURRENT USE OF AMIODARONE: ICD-10-CM

## 2020-03-24 DIAGNOSIS — I48.0 PAROXYSMAL A-FIB (HCC): Primary | ICD-10-CM

## 2020-04-02 ENCOUNTER — TELEPHONE (OUTPATIENT)
Dept: FAMILY MEDICINE CLINIC | Facility: CLINIC | Age: 80
End: 2020-04-02

## 2020-04-02 NOTE — TELEPHONE ENCOUNTER
Spoke with patient and she was asking for a refill on her 's medication. Her husbands chart reviewed and pharmacy already requested refill and it was filled this morning.

## 2020-04-02 NOTE — TELEPHONE ENCOUNTER
Patient would like to speak to someone regarding her Lab, she had a scheduled appt on April 16th     Informed patient that [employer] will be closed whole month of April but she would still like to speak to someone about scheduling, says her heart doctor

## 2020-04-30 ENCOUNTER — TELEPHONE (OUTPATIENT)
Dept: FAMILY MEDICINE CLINIC | Facility: CLINIC | Age: 80
End: 2020-04-30

## 2020-04-30 NOTE — TELEPHONE ENCOUNTER
Per Daisha Daniels-  CardioVersion cancelled yesterday. Patient in Sinus Rhythm. Potassium on Labs yesterday 3.0.   increased Potassium dose. Daisha Daniels will fax Patient's lab results from  [address] [facility] Nnamdi Childress, 04/30/20, 11:16 AM

## 2020-04-30 NOTE — TELEPHONE ENCOUNTER
Please note fax received from 's Office. Please review recent labs and can any upcoming lab orders be removed.   Josefina Bustos, 04/30/20, 3:56 PM

## 2020-04-30 NOTE — TELEPHONE ENCOUNTER
Lab results reviewed from Dr. Landon Grady office. Agree with increasing her oral potassium. She will be due for lab work in June. Please do the lab orders that are in the system now. TSH has been cancelled.

## 2020-04-30 NOTE — TELEPHONE ENCOUNTER
was to have cardio version yesterday that was cancelled due to low potassium-    has other concerns and may need more bw     please call     Future Appointments   Date Time Provider Jacey Emerson   6/15/2020  8:00 AM REF [place] REF [facility]

## 2020-06-05 ENCOUNTER — TELEPHONE (OUTPATIENT)
Dept: FAMILY MEDICINE CLINIC | Facility: CLINIC | Age: 80
End: 2020-06-05

## 2020-06-05 RX ORDER — AMIODARONE HYDROCHLORIDE 200 MG/1
200 TABLET ORAL DAILY
COMMUNITY
Start: 2020-04-29 | End: 2020-12-21

## 2020-06-05 RX ORDER — POTASSIUM CHLORIDE 20 MEQ/1
1 TABLET, EXTENDED RELEASE ORAL 2 TIMES DAILY
COMMUNITY
Start: 2020-04-30

## 2020-06-05 NOTE — TELEPHONE ENCOUNTER
Please call Charlie Lim. Her potassium level is now up to normal.  Her kidney function is the same. Please continue the same medications.

## 2020-06-15 ENCOUNTER — LABORATORY ENCOUNTER (OUTPATIENT)
Dept: LAB | Age: 80
End: 2020-06-15
Attending: FAMILY MEDICINE
Payer: MEDICARE

## 2020-06-15 DIAGNOSIS — Z79.899 LONG TERM CURRENT USE OF AMIODARONE: ICD-10-CM

## 2020-06-15 DIAGNOSIS — E11.9 CONTROLLED TYPE 2 DIABETES MELLITUS WITHOUT COMPLICATION, WITH LONG-TERM CURRENT USE OF INSULIN (HCC): ICD-10-CM

## 2020-06-15 DIAGNOSIS — E78.49 FAMILIAL MULTIPLE LIPOPROTEIN-TYPE HYPERLIPIDEMIA: ICD-10-CM

## 2020-06-15 DIAGNOSIS — Z79.4 CONTROLLED TYPE 2 DIABETES MELLITUS WITHOUT COMPLICATION, WITH LONG-TERM CURRENT USE OF INSULIN (HCC): ICD-10-CM

## 2020-06-15 DIAGNOSIS — I48.0 PAROXYSMAL A-FIB (HCC): ICD-10-CM

## 2020-06-15 DIAGNOSIS — E03.9 ACQUIRED HYPOTHYROIDISM: ICD-10-CM

## 2020-06-15 DIAGNOSIS — I48.0 PAROXYSMAL ATRIAL FIBRILLATION (HCC): ICD-10-CM

## 2020-06-15 DIAGNOSIS — I10 ESSENTIAL HYPERTENSION: ICD-10-CM

## 2020-06-15 PROCEDURE — 80053 COMPREHEN METABOLIC PANEL: CPT

## 2020-06-15 PROCEDURE — 80061 LIPID PANEL: CPT

## 2020-06-15 PROCEDURE — 82043 UR ALBUMIN QUANTITATIVE: CPT

## 2020-06-15 PROCEDURE — 84439 ASSAY OF FREE THYROXINE: CPT

## 2020-06-15 PROCEDURE — 83036 HEMOGLOBIN GLYCOSYLATED A1C: CPT

## 2020-06-15 PROCEDURE — 84443 ASSAY THYROID STIM HORMONE: CPT

## 2020-06-15 PROCEDURE — 36415 COLL VENOUS BLD VENIPUNCTURE: CPT

## 2020-06-15 PROCEDURE — 84550 ASSAY OF BLOOD/URIC ACID: CPT

## 2020-06-15 PROCEDURE — 85025 COMPLETE CBC W/AUTO DIFF WBC: CPT

## 2020-06-15 PROCEDURE — 82570 ASSAY OF URINE CREATININE: CPT

## 2020-06-23 ENCOUNTER — OFFICE VISIT (OUTPATIENT)
Dept: FAMILY MEDICINE CLINIC | Facility: CLINIC | Age: 80
End: 2020-06-23
Payer: MEDICARE

## 2020-06-23 VITALS
BODY MASS INDEX: 27.64 KG/M2 | RESPIRATION RATE: 18 BRPM | HEART RATE: 50 BPM | TEMPERATURE: 99 F | DIASTOLIC BLOOD PRESSURE: 80 MMHG | WEIGHT: 140.81 LBS | HEIGHT: 60 IN | OXYGEN SATURATION: 97 % | SYSTOLIC BLOOD PRESSURE: 136 MMHG

## 2020-06-23 DIAGNOSIS — J41.0 SIMPLE CHRONIC BRONCHITIS (HCC): ICD-10-CM

## 2020-06-23 DIAGNOSIS — Z79.4 TYPE 2 DIABETES MELLITUS WITH STAGE 3 CHRONIC KIDNEY DISEASE, WITH LONG-TERM CURRENT USE OF INSULIN (HCC): ICD-10-CM

## 2020-06-23 DIAGNOSIS — I50.32 CHRONIC DIASTOLIC HEART FAILURE (HCC): ICD-10-CM

## 2020-06-23 DIAGNOSIS — Z79.02 ENCOUNTER FOR CURRENT LONG TERM USE OF ANTITHROMBOTIC DRUG: ICD-10-CM

## 2020-06-23 DIAGNOSIS — I10 ESSENTIAL HYPERTENSION: ICD-10-CM

## 2020-06-23 DIAGNOSIS — E11.22 TYPE 2 DIABETES MELLITUS WITH STAGE 3 CHRONIC KIDNEY DISEASE, WITH LONG-TERM CURRENT USE OF INSULIN (HCC): ICD-10-CM

## 2020-06-23 DIAGNOSIS — N18.30 TYPE 2 DIABETES MELLITUS WITH STAGE 3 CHRONIC KIDNEY DISEASE, WITH LONG-TERM CURRENT USE OF INSULIN (HCC): ICD-10-CM

## 2020-06-23 DIAGNOSIS — E03.9 ACQUIRED HYPOTHYROIDISM: Primary | ICD-10-CM

## 2020-06-23 DIAGNOSIS — N18.30 STAGE 3 CHRONIC KIDNEY DISEASE (HCC): ICD-10-CM

## 2020-06-23 DIAGNOSIS — E78.49 FAMILIAL MULTIPLE LIPOPROTEIN-TYPE HYPERLIPIDEMIA: ICD-10-CM

## 2020-06-23 DIAGNOSIS — I48.0 PAROXYSMAL ATRIAL FIBRILLATION (HCC): ICD-10-CM

## 2020-06-23 PROBLEM — I11.9 BENIGN HYPERTENSIVE HEART DISEASE WITHOUT HEART FAILURE: Status: ACTIVE | Noted: 2020-03-17

## 2020-06-23 PROBLEM — E87.6 HYPOKALEMIA: Status: ACTIVE | Noted: 2020-04-29

## 2020-06-23 PROBLEM — I48.92 ATRIAL FLUTTER BY ELECTROCARDIOGRAM (HCC): Status: ACTIVE | Noted: 2020-03-17

## 2020-06-23 PROBLEM — J90 BILATERAL PLEURAL EFFUSION: Status: RESOLVED | Noted: 2019-12-27 | Resolved: 2020-06-23

## 2020-06-23 PROBLEM — I50.89 OTHER HEART FAILURE (HCC): Status: RESOLVED | Noted: 2020-01-17 | Resolved: 2020-06-23

## 2020-06-23 PROBLEM — E87.6 HYPOKALEMIA: Status: RESOLVED | Noted: 2020-04-29 | Resolved: 2020-06-23

## 2020-06-23 PROBLEM — I11.9 BENIGN HYPERTENSIVE HEART DISEASE WITHOUT HEART FAILURE: Status: RESOLVED | Noted: 2020-03-17 | Resolved: 2020-06-23

## 2020-06-23 PROBLEM — Z01.818 PREOP EXAMINATION: Status: RESOLVED | Noted: 2019-11-04 | Resolved: 2020-06-23

## 2020-06-23 PROCEDURE — 99214 OFFICE O/P EST MOD 30 MIN: CPT | Performed by: FAMILY MEDICINE

## 2020-06-23 RX ORDER — LEVOTHYROXINE SODIUM 0.07 MG/1
75 TABLET ORAL
Qty: 90 TABLET | Refills: 1 | Status: SHIPPED | OUTPATIENT
Start: 2020-06-23 | End: 2020-08-04

## 2020-06-23 RX ORDER — BISOPROLOL FUMARATE AND HYDROCHLOROTHIAZIDE 6.25; 5 MG/1; MG/1
1 TABLET ORAL DAILY
Qty: 90 TABLET | Refills: 1 | Status: SHIPPED | OUTPATIENT
Start: 2020-06-23 | End: 2020-12-21

## 2020-06-23 RX ORDER — DILTIAZEM HYDROCHLORIDE 120 MG/1
120 CAPSULE, COATED, EXTENDED RELEASE ORAL DAILY
Qty: 90 CAPSULE | Refills: 1 | COMMUNITY
Start: 2020-06-23 | End: 2021-06-21

## 2020-06-23 RX ORDER — ENALAPRIL MALEATE 10 MG/1
10 TABLET ORAL DAILY
Qty: 90 TABLET | Refills: 1 | Status: SHIPPED | OUTPATIENT
Start: 2020-06-23 | End: 2020-12-21

## 2020-06-23 NOTE — PROGRESS NOTES
[address]  PROGRESS NOTE  Chief Complaint:   Patient presents with: Follow - Up: 6 month      HPI:   This is a 78year old female coming in for follow-up.   She was admitted to [facility] December 27, 2019 with bilateral pleura VLDL 18 0 - 30 mg/dL    Non HDL Chol 128 <130 mg/dL    FASTING Yes    URIC ACID, SERUM   Result Value Ref Range    Uric Acid 6.9 (H) 2.6 - 6.0 mg/dL   ASSAY, THYROID STIM HORMONE   Result Value Ref Range    TSH 0.877 0.358 - 3.740 mIU/mL   FREE T4 (FREE Alcohol use: No      Alcohol/week: 0.0 standard drinks    Drug use: No    Family History:  Family History   Problem Relation Age of Onset   • Cancer Father    • Cancer Mother      Allergies:    Iodine (Topical)        SHORTNESS OF BREATH, WHEEZING  Pravast 100 each 3   • cetirizine (ALL DAY ALLERGY) 10 MG Oral Tab Take 1 tablet by mouth daily as needed. Counseling given: Not Answered       REVIEW OF SYSTEMS:   CONSTITUTIONAL:  Denies unusual weight gain/loss, fever, chills, or fatigue.   EENT:  Eyes: conjunctivae clear bilaterally, no eye discharge Ears: External normal. Nose: patent, no nasal discharge Throat:  No tonsillar erythema or exudate. Mouth:  No oral lesions or ulcerations, good dentition. NECK: Supple, no CLAD, no JVD, no thyromegaly.   SK diastolic heart failure has improved significantly and clinically she is not in any distress now. Plan: No new therapy recommended now. 7. Essential hypertension  Her blood pressure is well controlled with the current medications.     8. 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      Problem List:  Patient Active Problem List:     Atrial fibrillation (HCC)     Simple chronic bronchitis ([facility] [address]     Type 2 diabetes mellitus with kidney complication, with long-term current use of i

## 2020-06-24 ENCOUNTER — TELEPHONE (OUTPATIENT)
Dept: FAMILY MEDICINE CLINIC | Facility: CLINIC | Age: 80
End: 2020-06-24

## 2020-06-24 DIAGNOSIS — Z79.4 TYPE 2 DIABETES MELLITUS WITH STAGE 3 CHRONIC KIDNEY DISEASE, WITH LONG-TERM CURRENT USE OF INSULIN (HCC): Primary | ICD-10-CM

## 2020-06-24 DIAGNOSIS — N18.30 TYPE 2 DIABETES MELLITUS WITH STAGE 3 CHRONIC KIDNEY DISEASE, WITH LONG-TERM CURRENT USE OF INSULIN (HCC): Primary | ICD-10-CM

## 2020-06-24 DIAGNOSIS — E11.22 TYPE 2 DIABETES MELLITUS WITH STAGE 3 CHRONIC KIDNEY DISEASE, WITH LONG-TERM CURRENT USE OF INSULIN (HCC): Primary | ICD-10-CM

## 2020-06-24 NOTE — TELEPHONE ENCOUNTER
She has orders for thyroid scheduled for August.  She also has orders for chemistry profile and hemoglobin A1c needed for December. Please watch the dates on the scheduled labs.

## 2020-08-03 ENCOUNTER — APPOINTMENT (OUTPATIENT)
Dept: LAB | Age: 80
End: 2020-08-03
Attending: FAMILY MEDICINE
Payer: MEDICARE

## 2020-08-03 DIAGNOSIS — E03.9 ACQUIRED HYPOTHYROIDISM: ICD-10-CM

## 2020-08-03 LAB
T4 FREE SERPL-MCNC: 1.4 NG/DL (ref 0.8–1.7)
TSI SER-ACNC: 10.4 MIU/ML (ref 0.36–3.74)

## 2020-08-03 PROCEDURE — 84443 ASSAY THYROID STIM HORMONE: CPT

## 2020-08-03 PROCEDURE — 36415 COLL VENOUS BLD VENIPUNCTURE: CPT

## 2020-08-03 PROCEDURE — 84439 ASSAY OF FREE THYROXINE: CPT

## 2020-08-04 ENCOUNTER — TELEPHONE (OUTPATIENT)
Dept: FAMILY MEDICINE CLINIC | Facility: CLINIC | Age: 80
End: 2020-08-04

## 2020-08-04 DIAGNOSIS — E03.9 ACQUIRED HYPOTHYROIDISM: Primary | ICD-10-CM

## 2020-08-04 RX ORDER — LEVOTHYROXINE SODIUM 88 UG/1
88 TABLET ORAL
Qty: 90 TABLET | Refills: 1 | Status: SHIPPED | OUTPATIENT
Start: 2020-08-04 | End: 2020-12-21

## 2020-08-04 NOTE — TELEPHONE ENCOUNTER
Please call Isabel Woody.  100 mcg of levothyroxine was too much and 75 mcg is not enough. Her TSH is now up to 10. Her free T4 is normal.  Plan: I will send in a new prescription for levothyroxine 88 mcg. Please take 1 of those daily.   Recheck thyroid labs agai

## 2020-10-16 ENCOUNTER — LAB ENCOUNTER (OUTPATIENT)
Dept: LAB | Age: 80
End: 2020-10-16
Attending: FAMILY MEDICINE
Payer: MEDICARE

## 2020-10-16 DIAGNOSIS — E03.9 ACQUIRED HYPOTHYROIDISM: ICD-10-CM

## 2020-10-16 PROCEDURE — 84443 ASSAY THYROID STIM HORMONE: CPT

## 2020-10-16 PROCEDURE — 36415 COLL VENOUS BLD VENIPUNCTURE: CPT

## 2020-10-16 PROCEDURE — 84439 ASSAY OF FREE THYROXINE: CPT

## 2020-10-19 ENCOUNTER — TELEPHONE (OUTPATIENT)
Dept: FAMILY MEDICINE CLINIC | Facility: CLINIC | Age: 80
End: 2020-10-19

## 2020-10-19 NOTE — TELEPHONE ENCOUNTER
No will have patient take 88mcg first thing in the morning on empty stomach with glass of water at this time then wait 45 minutes to an hour before eating. Lab order placed, repeat in 6-8 weeks.

## 2020-10-19 NOTE — TELEPHONE ENCOUNTER
Pt started taking medication as soon as it was prescribed but takes it at 8 pm. Informed patient to take medication in the morning on an empty stomach except water. She has not had any side effects and feels good. No missing dose.  Due to taking it at night

## 2020-10-19 NOTE — TELEPHONE ENCOUNTER
----- Message from CARROL Kebede sent at 10/19/2020  9:34 AM CDT -----  Dr. Concha Martínez out of the office. Patient's repeat TSH still high, meaning she needs a little additional supplementation.    Patient was recently changed to 88mcg daily, previou

## 2020-10-20 ENCOUNTER — IMMUNIZATION (OUTPATIENT)
Dept: FAMILY MEDICINE CLINIC | Facility: CLINIC | Age: 80
End: 2020-10-20
Payer: MEDICARE

## 2020-10-20 DIAGNOSIS — Z23 NEED FOR VACCINATION: ICD-10-CM

## 2020-10-20 PROCEDURE — G0008 ADMIN INFLUENZA VIRUS VAC: HCPCS | Performed by: FAMILY MEDICINE

## 2020-10-20 PROCEDURE — 90662 IIV NO PRSV INCREASED AG IM: CPT | Performed by: FAMILY MEDICINE

## 2020-12-01 ENCOUNTER — LAB ENCOUNTER (OUTPATIENT)
Dept: LAB | Age: 80
End: 2020-12-01
Attending: NURSE PRACTITIONER
Payer: MEDICARE

## 2020-12-01 DIAGNOSIS — E11.22 TYPE 2 DIABETES MELLITUS WITH STAGE 3 CHRONIC KIDNEY DISEASE, WITH LONG-TERM CURRENT USE OF INSULIN (HCC): ICD-10-CM

## 2020-12-01 DIAGNOSIS — Z79.4 TYPE 2 DIABETES MELLITUS WITH STAGE 3 CHRONIC KIDNEY DISEASE, WITH LONG-TERM CURRENT USE OF INSULIN (HCC): ICD-10-CM

## 2020-12-01 DIAGNOSIS — E03.9 ACQUIRED HYPOTHYROIDISM: ICD-10-CM

## 2020-12-01 DIAGNOSIS — N18.30 TYPE 2 DIABETES MELLITUS WITH STAGE 3 CHRONIC KIDNEY DISEASE, WITH LONG-TERM CURRENT USE OF INSULIN (HCC): ICD-10-CM

## 2020-12-01 PROCEDURE — 80053 COMPREHEN METABOLIC PANEL: CPT

## 2020-12-01 PROCEDURE — 84443 ASSAY THYROID STIM HORMONE: CPT

## 2020-12-01 PROCEDURE — 84439 ASSAY OF FREE THYROXINE: CPT

## 2020-12-01 PROCEDURE — 83036 HEMOGLOBIN GLYCOSYLATED A1C: CPT

## 2020-12-01 PROCEDURE — 36415 COLL VENOUS BLD VENIPUNCTURE: CPT

## 2020-12-03 ENCOUNTER — TELEPHONE (OUTPATIENT)
Dept: FAMILY MEDICINE CLINIC | Facility: CLINIC | Age: 80
End: 2020-12-03

## 2020-12-03 NOTE — TELEPHONE ENCOUNTER
----- Message from CARROL Black sent at 12/3/2020  2:59 PM CST -----  Dr. Rodger Gonzales out of the office. A1C minimally elevated compared to prior up to 7.1. Renal function stable though minimally worsened compared to last draw 5 months ago.   Italia Landon

## 2020-12-03 NOTE — TELEPHONE ENCOUNTER
----- Message from CARROL Aldridge sent at 12/3/2020  3:21 PM CST -----  Patient's TSH is back in the normal range but the free T4 is not elevated indicating that she is receiving too much of the medication.   Had discovered previously with last phon

## 2020-12-08 NOTE — TELEPHONE ENCOUNTER
Patient states she is now taking her Thyroid Medication in the morning. Patient has a follow up appt with  in a couple weeks. Will discuss further at time of visit. Tino Saldaña, 12/08/20, 4:01 PM    Future appt:     Your appointments     Husam

## 2020-12-15 ENCOUNTER — TELEPHONE (OUTPATIENT)
Dept: FAMILY MEDICINE CLINIC | Facility: CLINIC | Age: 80
End: 2020-12-15

## 2020-12-21 ENCOUNTER — OFFICE VISIT (OUTPATIENT)
Dept: FAMILY MEDICINE CLINIC | Facility: CLINIC | Age: 80
End: 2020-12-21
Payer: MEDICARE

## 2020-12-21 VITALS
TEMPERATURE: 98 F | OXYGEN SATURATION: 96 % | SYSTOLIC BLOOD PRESSURE: 170 MMHG | BODY MASS INDEX: 29.2 KG/M2 | WEIGHT: 146.81 LBS | HEIGHT: 59.5 IN | RESPIRATION RATE: 16 BRPM | HEART RATE: 60 BPM | DIASTOLIC BLOOD PRESSURE: 74 MMHG

## 2020-12-21 DIAGNOSIS — J41.0 SIMPLE CHRONIC BRONCHITIS (HCC): ICD-10-CM

## 2020-12-21 DIAGNOSIS — Z86.010 HISTORY OF COLONIC POLYPS: ICD-10-CM

## 2020-12-21 DIAGNOSIS — N18.32 STAGE 3B CHRONIC KIDNEY DISEASE (HCC): ICD-10-CM

## 2020-12-21 DIAGNOSIS — E11.22 TYPE 2 DIABETES MELLITUS WITH STAGE 3B CHRONIC KIDNEY DISEASE, WITH LONG-TERM CURRENT USE OF INSULIN (HCC): ICD-10-CM

## 2020-12-21 DIAGNOSIS — E03.9 ACQUIRED HYPOTHYROIDISM: ICD-10-CM

## 2020-12-21 DIAGNOSIS — Z79.02 ENCOUNTER FOR CURRENT LONG TERM USE OF ANTITHROMBOTIC DRUG: ICD-10-CM

## 2020-12-21 DIAGNOSIS — E78.49 FAMILIAL MULTIPLE LIPOPROTEIN-TYPE HYPERLIPIDEMIA: ICD-10-CM

## 2020-12-21 DIAGNOSIS — Z79.4 TYPE 2 DIABETES MELLITUS WITH STAGE 3B CHRONIC KIDNEY DISEASE, WITH LONG-TERM CURRENT USE OF INSULIN (HCC): ICD-10-CM

## 2020-12-21 DIAGNOSIS — N18.32 TYPE 2 DIABETES MELLITUS WITH STAGE 3B CHRONIC KIDNEY DISEASE, WITH LONG-TERM CURRENT USE OF INSULIN (HCC): ICD-10-CM

## 2020-12-21 DIAGNOSIS — I10 ESSENTIAL HYPERTENSION: ICD-10-CM

## 2020-12-21 DIAGNOSIS — Z00.00 ENCOUNTER FOR ANNUAL HEALTH EXAMINATION: Primary | ICD-10-CM

## 2020-12-21 DIAGNOSIS — I48.92 ATRIAL FLUTTER BY ELECTROCARDIOGRAM (HCC): ICD-10-CM

## 2020-12-21 DIAGNOSIS — Z79.899 ENCOUNTER FOR LONG-TERM (CURRENT) USE OF MEDICATIONS: ICD-10-CM

## 2020-12-21 DIAGNOSIS — I50.32 CHRONIC DIASTOLIC HEART FAILURE (HCC): ICD-10-CM

## 2020-12-21 DIAGNOSIS — I48.0 PAROXYSMAL ATRIAL FIBRILLATION (HCC): ICD-10-CM

## 2020-12-21 DIAGNOSIS — M67.40 GANGLION CYST: ICD-10-CM

## 2020-12-21 PROCEDURE — G0439 PPPS, SUBSEQ VISIT: HCPCS | Performed by: FAMILY MEDICINE

## 2020-12-21 PROCEDURE — 99213 OFFICE O/P EST LOW 20 MIN: CPT | Performed by: FAMILY MEDICINE

## 2020-12-21 RX ORDER — FUROSEMIDE 20 MG/1
TABLET ORAL
COMMUNITY
Start: 2020-11-13 | End: 2021-06-21

## 2020-12-21 RX ORDER — BISOPROLOL FUMARATE AND HYDROCHLOROTHIAZIDE 6.25; 5 MG/1; MG/1
1 TABLET ORAL DAILY
Qty: 90 TABLET | Refills: 1 | Status: SHIPPED | OUTPATIENT
Start: 2020-12-21 | End: 2021-06-21

## 2020-12-21 RX ORDER — LEVOTHYROXINE SODIUM 88 UG/1
88 TABLET ORAL
Qty: 90 TABLET | Refills: 1 | Status: SHIPPED | OUTPATIENT
Start: 2020-12-21 | End: 2021-07-20

## 2020-12-21 RX ORDER — ENALAPRIL MALEATE 10 MG/1
10 TABLET ORAL DAILY
Qty: 90 TABLET | Refills: 1 | Status: SHIPPED | OUTPATIENT
Start: 2020-12-21 | End: 2021-06-21

## 2020-12-21 NOTE — PROGRESS NOTES
REASON FOR VISIT:    Vera Lopez is a 78year old female who presents for a Medicare Annual Wellness visit. She is here for her annual wellness exam.    She is able to do a lot less walking now.   She said that over the summer she was able to go out Sodium 88 MCG Oral Tab Take 1 tablet (88 mcg total) by mouth before breakfast. 90 tablet 1   • Enalapril Maleate 10 MG Oral Tab Take 1 tablet (10 mg total) by mouth daily.  90 tablet 1   • bisoprolol-hydrochlorothiazide 5-6.25 MG Oral Tab Take 1 tablet by m 0. 99 0.95   Some recent data might be hidden     AST and ALT Latest Ref Rng & Units 12/1/2020 6/15/2020 4/29/2020 12/13/2019 6/6/2019 12/5/2018 6/5/2018   AST 15 - 37 U/L 14(L) 13(L) 20 12(L) 18 13(L) 11(L)   ALT 13 - 56 U/L 24 21 16 21 25 21 18     TSH an living will?: No     Cognitive Assessment     What day of the week is this?: Correct  What month is it?: Correct  What year is it?: Correct  Recall \"Ball\": Correct  Recall \"Flag\": Correct  Recall \"Tree\": Correct         PREVENTATIVE SERVICES   INDICA Dilated Eye Exam 12/16/2019              ALLERGIES:     Iodine (Topical)        SHORTNESS OF BREATH, WHEEZING  Pravastatin                 Comment:Other reaction(s): severe muscle aches  Sulfa Antibiotics           Comment:Other reaction(s): developed rash dysuria, vaginal discharge or itching  MUSCULOSKELETAL: denies back pain  NEURO: denies headaches  PSYCHE: denies depression or anxiety  HEMATOLOGIC: denies hx of anemia  ENDOCRINE: She does not have any high or low blood sugar reactions.   ALL/ASTHMA: sonia chronic bronchitis ([address]  She does have COPD that is relatively well controlled. No new medications recommended. - OFFICE/OUTPT VISIT,NACHO GREENE III    4. Familial multiple lipoprotein-type hyperlipidemia  She has a history of elevated cholesterol.   No new III     .[email or web address]   The patient indicates understanding of these issues and agrees to the plan.   The patient is asked to return in 6 months for medication check  Exercise counseling perfomed    SUGGESTED VACCINATIONS - Influenza, Pneumococcal, Zoster, Tetanus

## 2020-12-21 NOTE — PATIENT INSTRUCTIONS
Recommended Websites for Advanced Directives    [email or web address]. org/publications/Documents/personal_[email or web address]. pdf  An information packet, including necessary form from the [employer] website. http://www. [place]ph.state. [place]/public/books/adv

## 2021-02-01 DIAGNOSIS — Z23 NEED FOR VACCINATION: ICD-10-CM

## 2021-02-17 NOTE — TELEPHONE ENCOUNTER
Future appt:     Your appointments     Date & Time Appointment Department [facility])    Jun 16, 2021  8:00 AM CDT Laboratory Visit with REF Rupal Vallecillo [facility] ([facility])        Jun 21, 2021  9:00 AM CDT Follow Up Visit with Geovany Matias

## 2021-03-10 RX ORDER — ENALAPRIL MALEATE 10 MG/1
TABLET ORAL
Qty: 90 TABLET | Refills: 1 | OUTPATIENT
Start: 2021-03-10

## 2021-04-28 ENCOUNTER — LAB ENCOUNTER (OUTPATIENT)
Dept: LAB | Age: 81
End: 2021-04-28
Attending: FAMILY MEDICINE
Payer: MEDICARE

## 2021-04-28 DIAGNOSIS — I50.89 OTHER HEART FAILURE (HCC): Primary | ICD-10-CM

## 2021-04-28 PROCEDURE — 80048 BASIC METABOLIC PNL TOTAL CA: CPT

## 2021-04-28 PROCEDURE — 36415 COLL VENOUS BLD VENIPUNCTURE: CPT

## 2021-06-16 ENCOUNTER — TELEPHONE (OUTPATIENT)
Dept: FAMILY MEDICINE CLINIC | Facility: CLINIC | Age: 81
End: 2021-06-16

## 2021-06-16 ENCOUNTER — LAB ENCOUNTER (OUTPATIENT)
Dept: LAB | Age: 81
End: 2021-06-16
Attending: FAMILY MEDICINE
Payer: MEDICARE

## 2021-06-16 DIAGNOSIS — Z79.4 TYPE 2 DIABETES MELLITUS WITH STAGE 3B CHRONIC KIDNEY DISEASE, WITH LONG-TERM CURRENT USE OF INSULIN (HCC): ICD-10-CM

## 2021-06-16 DIAGNOSIS — E03.9 ACQUIRED HYPOTHYROIDISM: ICD-10-CM

## 2021-06-16 DIAGNOSIS — N18.32 TYPE 2 DIABETES MELLITUS WITH STAGE 3B CHRONIC KIDNEY DISEASE, WITH LONG-TERM CURRENT USE OF INSULIN (HCC): ICD-10-CM

## 2021-06-16 DIAGNOSIS — I50.32 CHRONIC DIASTOLIC HEART FAILURE (HCC): ICD-10-CM

## 2021-06-16 DIAGNOSIS — Z79.4 TYPE 2 DIABETES MELLITUS WITH STAGE 3B CHRONIC KIDNEY DISEASE, WITH LONG-TERM CURRENT USE OF INSULIN (HCC): Primary | ICD-10-CM

## 2021-06-16 DIAGNOSIS — E11.22 TYPE 2 DIABETES MELLITUS WITH STAGE 3B CHRONIC KIDNEY DISEASE, WITH LONG-TERM CURRENT USE OF INSULIN (HCC): Primary | ICD-10-CM

## 2021-06-16 DIAGNOSIS — E78.49 FAMILIAL MULTIPLE LIPOPROTEIN-TYPE HYPERLIPIDEMIA: ICD-10-CM

## 2021-06-16 DIAGNOSIS — N18.32 TYPE 2 DIABETES MELLITUS WITH STAGE 3B CHRONIC KIDNEY DISEASE, WITH LONG-TERM CURRENT USE OF INSULIN (HCC): Primary | ICD-10-CM

## 2021-06-16 DIAGNOSIS — E11.22 TYPE 2 DIABETES MELLITUS WITH STAGE 3B CHRONIC KIDNEY DISEASE, WITH LONG-TERM CURRENT USE OF INSULIN (HCC): ICD-10-CM

## 2021-06-16 PROCEDURE — 85025 COMPLETE CBC W/AUTO DIFF WBC: CPT

## 2021-06-16 PROCEDURE — 36415 COLL VENOUS BLD VENIPUNCTURE: CPT

## 2021-06-16 PROCEDURE — 82570 ASSAY OF URINE CREATININE: CPT

## 2021-06-16 PROCEDURE — 80053 COMPREHEN METABOLIC PANEL: CPT

## 2021-06-16 PROCEDURE — 84550 ASSAY OF BLOOD/URIC ACID: CPT

## 2021-06-16 PROCEDURE — 84443 ASSAY THYROID STIM HORMONE: CPT

## 2021-06-16 PROCEDURE — 80061 LIPID PANEL: CPT

## 2021-06-16 PROCEDURE — 83036 HEMOGLOBIN GLYCOSYLATED A1C: CPT

## 2021-06-16 PROCEDURE — 82043 UR ALBUMIN QUANTITATIVE: CPT

## 2021-06-21 ENCOUNTER — OFFICE VISIT (OUTPATIENT)
Dept: FAMILY MEDICINE CLINIC | Facility: CLINIC | Age: 81
End: 2021-06-21
Payer: MEDICARE

## 2021-06-21 VITALS
DIASTOLIC BLOOD PRESSURE: 76 MMHG | SYSTOLIC BLOOD PRESSURE: 148 MMHG | HEIGHT: 59 IN | TEMPERATURE: 99 F | WEIGHT: 149 LBS | HEART RATE: 68 BPM | BODY MASS INDEX: 30.04 KG/M2 | RESPIRATION RATE: 16 BRPM | OXYGEN SATURATION: 98 %

## 2021-06-21 DIAGNOSIS — E11.22 TYPE 2 DIABETES MELLITUS WITH STAGE 3B CHRONIC KIDNEY DISEASE, WITH LONG-TERM CURRENT USE OF INSULIN (HCC): Primary | ICD-10-CM

## 2021-06-21 DIAGNOSIS — Z79.4 TYPE 2 DIABETES MELLITUS WITH STAGE 3B CHRONIC KIDNEY DISEASE, WITH LONG-TERM CURRENT USE OF INSULIN (HCC): Primary | ICD-10-CM

## 2021-06-21 DIAGNOSIS — J41.0 SIMPLE CHRONIC BRONCHITIS (HCC): ICD-10-CM

## 2021-06-21 DIAGNOSIS — N18.32 TYPE 2 DIABETES MELLITUS WITH STAGE 3B CHRONIC KIDNEY DISEASE, WITH LONG-TERM CURRENT USE OF INSULIN (HCC): Primary | ICD-10-CM

## 2021-06-21 DIAGNOSIS — E03.9 ACQUIRED HYPOTHYROIDISM: ICD-10-CM

## 2021-06-21 DIAGNOSIS — N18.32 STAGE 3B CHRONIC KIDNEY DISEASE (HCC): ICD-10-CM

## 2021-06-21 DIAGNOSIS — I48.0 PAROXYSMAL ATRIAL FIBRILLATION (HCC): ICD-10-CM

## 2021-06-21 DIAGNOSIS — I10 ESSENTIAL HYPERTENSION: ICD-10-CM

## 2021-06-21 DIAGNOSIS — I50.32 CHRONIC DIASTOLIC HEART FAILURE (HCC): ICD-10-CM

## 2021-06-21 PROBLEM — I50.89 OTHER HEART FAILURE (HCC): Status: RESOLVED | Noted: 2020-01-17 | Resolved: 2021-06-21

## 2021-06-21 PROBLEM — I48.92 ATRIAL FLUTTER BY ELECTROCARDIOGRAM (HCC): Status: RESOLVED | Noted: 2020-03-17 | Resolved: 2021-06-21

## 2021-06-21 PROCEDURE — 99214 OFFICE O/P EST MOD 30 MIN: CPT | Performed by: FAMILY MEDICINE

## 2021-06-21 RX ORDER — DILTIAZEM HYDROCHLORIDE 120 MG/1
120 CAPSULE, COATED, EXTENDED RELEASE ORAL DAILY
Qty: 90 CAPSULE | Refills: 1 | Status: SHIPPED | OUTPATIENT
Start: 2021-06-21 | End: 2021-11-19

## 2021-06-21 RX ORDER — BISOPROLOL FUMARATE AND HYDROCHLOROTHIAZIDE 6.25; 5 MG/1; MG/1
1 TABLET ORAL DAILY
Qty: 90 TABLET | Refills: 1 | Status: SHIPPED | OUTPATIENT
Start: 2021-06-21 | End: 2021-12-03

## 2021-06-21 RX ORDER — ENALAPRIL MALEATE 10 MG/1
10 TABLET ORAL DAILY
Qty: 90 TABLET | Refills: 1 | Status: SHIPPED | OUTPATIENT
Start: 2021-06-21 | End: 2021-12-03

## 2021-06-21 RX ORDER — FUROSEMIDE 40 MG/1
40 TABLET ORAL DAILY
COMMUNITY
Start: 2021-06-21

## 2021-06-21 NOTE — PROGRESS NOTES
[address]  PROGRESS NOTE  Chief Complaint:   Patient presents with: Follow - Up: 6mo      HPI:   This is a 78year old female coming in for follow-up on her diabetes. She notes that her blood sugars have been up slightly.   She does ha Cholesterol 69 (H) 40 - 59 mg/dL    Triglycerides 98 30 - 149 mg/dL    LDL Cholesterol 108 (H) <100 mg/dL    VLDL 17 0 - 30 mg/dL    Non HDL Chol 125 <130 mg/dL    FASTING Yes    MICROALB/CREAT RATIO, RANDOM URINE   Result Value Ref Range    Microalbumin, tobacco: Never Used    Vaping Use      Vaping Use: Never used    Alcohol use: No      Alcohol/week: 0.0 standard drinks    Drug use: No    Family History:  Family History   Problem Relation Age of Onset   • Cancer Father    • Cancer Mother      Allergies: (Patient not taking: Reported on 6/21/2021 ) 1 Inhaler 0   • Glucose Blood In Vitro Strip 1x daily glucose testing.   Dx:  E11.9  Non Insulin Dependent 100 each 3      Counseling given: Not Answered       REVIEW OF SYSTEMS:   CONSTITUTIONAL:  Denies unusual distress. HEENT:  Head:  Normocephalic, atraumatic Eyes: EOMI, PERRLA, no scleral icterus, conjunctivae clear bilaterally, no eye discharge Ears: External normal. Nose: patent, no nasal discharge Throat:  No tonsillar erythema or exudate.   Mouth:  No oral kidney disease ([facility] [address]  Her kidney disease is unchanged. Continue the same medications. Return in 6 months for a Medicare annual wellness visit. She will be due for additional blood work then.       Meds & Refills for this Visit:  Requested Prescriptions

## 2021-06-21 NOTE — PATIENT INSTRUCTIONS
Continue the same medications. Please schedule a mammogram.  Recheck in 6 months; annual medicare wellness visit then.

## 2021-06-30 DIAGNOSIS — N18.32 TYPE 2 DIABETES MELLITUS WITH STAGE 3B CHRONIC KIDNEY DISEASE, WITH LONG-TERM CURRENT USE OF INSULIN (HCC): Primary | ICD-10-CM

## 2021-06-30 DIAGNOSIS — E11.22 TYPE 2 DIABETES MELLITUS WITH STAGE 3B CHRONIC KIDNEY DISEASE, WITH LONG-TERM CURRENT USE OF INSULIN (HCC): Primary | ICD-10-CM

## 2021-06-30 DIAGNOSIS — Z79.4 TYPE 2 DIABETES MELLITUS WITH STAGE 3B CHRONIC KIDNEY DISEASE, WITH LONG-TERM CURRENT USE OF INSULIN (HCC): Primary | ICD-10-CM

## 2021-06-30 NOTE — TELEPHONE ENCOUNTER
Needs refill of metformin sent to [facility]. Would like call once script is sent.

## 2021-06-30 NOTE — TELEPHONE ENCOUNTER
Metformin: 12/21/20     Return in about 6 months (around 12/21/2021). Future appt:     Your appointments     Date & Time Appointment Department [facility])    Dec 20, 2021  8:15 AM CST Laboratory Visit with REF Licha Salas Reference Lab ([facility] Ref [facility]

## 2021-07-20 RX ORDER — LEVOTHYROXINE SODIUM 88 UG/1
88 TABLET ORAL
Qty: 90 TABLET | Refills: 1 | Status: SHIPPED | OUTPATIENT
Start: 2021-07-20 | End: 2022-01-07

## 2021-07-20 NOTE — TELEPHONE ENCOUNTER
Levothyroxine: 12/21/20    Future appt:     Your appointments     Date & Time Appointment Department [facility])    Dec 20, 2021  8:15 AM CST Laboratory Visit with REF Francy Ugarte Reference Lab ([facility] Ref [facility])        Dec 22, 2021  8:30 AM CST [facility]

## 2021-07-26 ENCOUNTER — LAB ENCOUNTER (OUTPATIENT)
Dept: LAB | Age: 81
End: 2021-07-26
Attending: INTERNAL MEDICINE
Payer: MEDICARE

## 2021-07-26 DIAGNOSIS — I50.32 CHRONIC DIASTOLIC HEART FAILURE (HCC): Primary | ICD-10-CM

## 2021-07-26 LAB
ANION GAP SERPL CALC-SCNC: 6 MMOL/L (ref 0–18)
BUN BLD-MCNC: 23 MG/DL (ref 7–18)
BUN/CREAT SERPL: 19.2 (ref 10–20)
CALCIUM BLD-MCNC: 8.9 MG/DL (ref 8.5–10.1)
CHLORIDE SERPL-SCNC: 105 MMOL/L (ref 98–112)
CO2 SERPL-SCNC: 28 MMOL/L (ref 21–32)
CREAT BLD-MCNC: 1.2 MG/DL
GLUCOSE BLD-MCNC: 149 MG/DL (ref 70–99)
NT-PROBNP SERPL-MCNC: 978 PG/ML (ref ?–450)
OSMOLALITY SERPL CALC.SUM OF ELEC: 294 MOSM/KG (ref 275–295)
PATIENT FASTING Y/N/NP: YES
POTASSIUM SERPL-SCNC: 4.2 MMOL/L (ref 3.5–5.1)
SODIUM SERPL-SCNC: 139 MMOL/L (ref 136–145)

## 2021-07-26 PROCEDURE — 83880 ASSAY OF NATRIURETIC PEPTIDE: CPT

## 2021-07-26 PROCEDURE — 80048 BASIC METABOLIC PNL TOTAL CA: CPT

## 2021-07-26 PROCEDURE — 36415 COLL VENOUS BLD VENIPUNCTURE: CPT

## 2021-11-19 RX ORDER — DILTIAZEM HYDROCHLORIDE 120 MG/1
CAPSULE, COATED, EXTENDED RELEASE ORAL
Qty: 90 CAPSULE | Refills: 1 | Status: SHIPPED | OUTPATIENT
Start: 2021-11-19

## 2021-11-19 NOTE — TELEPHONE ENCOUNTER
Diltiazem: 6/21/21     Return in about 6 months (around 12/21/2021). Future appt:     Your appointments     Date & Time Appointment Department [facility])    Dec 20, 2021  8:15 AM CST Laboratory Visit with REF Ramo Leos Reference Lab ([facility] Ref Lab [facility]

## 2021-12-03 RX ORDER — BISOPROLOL FUMARATE AND HYDROCHLOROTHIAZIDE 6.25; 5 MG/1; MG/1
TABLET ORAL
Qty: 90 TABLET | Refills: 1 | Status: SHIPPED | OUTPATIENT
Start: 2021-12-03

## 2021-12-03 RX ORDER — ENALAPRIL MALEATE 10 MG/1
TABLET ORAL
Qty: 90 TABLET | Refills: 1 | Status: SHIPPED | OUTPATIENT
Start: 2021-12-03

## 2021-12-03 NOTE — TELEPHONE ENCOUNTER
Future appt:     Your appointments     Date & Time Appointment Department [facility])    Dec 20, 2021  8:15 AM CST Laboratory Visit with REF Ramo Leos Reference Lab ([facility] Ref Lab [facility])        Dec 29, 2021  9:45 AM CST Medicare Annual Well Visit with JUAN CARLOS

## 2021-12-20 ENCOUNTER — LABORATORY ENCOUNTER (OUTPATIENT)
Dept: LAB | Age: 81
End: 2021-12-20
Attending: FAMILY MEDICINE
Payer: MEDICARE

## 2021-12-20 DIAGNOSIS — E11.22 TYPE 2 DIABETES MELLITUS WITH STAGE 3B CHRONIC KIDNEY DISEASE, WITH LONG-TERM CURRENT USE OF INSULIN (HCC): ICD-10-CM

## 2021-12-20 DIAGNOSIS — I50.32 CHRONIC DIASTOLIC HEART FAILURE (HCC): ICD-10-CM

## 2021-12-20 DIAGNOSIS — N18.32 TYPE 2 DIABETES MELLITUS WITH STAGE 3B CHRONIC KIDNEY DISEASE, WITH LONG-TERM CURRENT USE OF INSULIN (HCC): ICD-10-CM

## 2021-12-20 DIAGNOSIS — Z79.4 TYPE 2 DIABETES MELLITUS WITH STAGE 3B CHRONIC KIDNEY DISEASE, WITH LONG-TERM CURRENT USE OF INSULIN (HCC): ICD-10-CM

## 2021-12-20 PROCEDURE — 36415 COLL VENOUS BLD VENIPUNCTURE: CPT

## 2021-12-20 PROCEDURE — 80053 COMPREHEN METABOLIC PANEL: CPT

## 2021-12-20 PROCEDURE — 83036 HEMOGLOBIN GLYCOSYLATED A1C: CPT

## 2021-12-29 ENCOUNTER — OFFICE VISIT (OUTPATIENT)
Dept: FAMILY MEDICINE CLINIC | Facility: CLINIC | Age: 81
End: 2021-12-29
Payer: MEDICARE

## 2021-12-29 VITALS
TEMPERATURE: 99 F | DIASTOLIC BLOOD PRESSURE: 74 MMHG | HEIGHT: 59 IN | RESPIRATION RATE: 16 BRPM | WEIGHT: 145.38 LBS | BODY MASS INDEX: 29.31 KG/M2 | HEART RATE: 76 BPM | SYSTOLIC BLOOD PRESSURE: 142 MMHG

## 2021-12-29 DIAGNOSIS — E78.49 FAMILIAL MULTIPLE LIPOPROTEIN-TYPE HYPERLIPIDEMIA: ICD-10-CM

## 2021-12-29 DIAGNOSIS — J41.0 SIMPLE CHRONIC BRONCHITIS (HCC): ICD-10-CM

## 2021-12-29 DIAGNOSIS — M67.40 GANGLION CYST: ICD-10-CM

## 2021-12-29 DIAGNOSIS — I50.32 CHRONIC DIASTOLIC HEART FAILURE (HCC): ICD-10-CM

## 2021-12-29 DIAGNOSIS — E11.22 TYPE 2 DIABETES MELLITUS WITH STAGE 3B CHRONIC KIDNEY DISEASE, WITH LONG-TERM CURRENT USE OF INSULIN (HCC): ICD-10-CM

## 2021-12-29 DIAGNOSIS — N18.32 STAGE 3B CHRONIC KIDNEY DISEASE (HCC): ICD-10-CM

## 2021-12-29 DIAGNOSIS — E03.9 ACQUIRED HYPOTHYROIDISM: ICD-10-CM

## 2021-12-29 DIAGNOSIS — Z12.31 SCREENING MAMMOGRAM FOR BREAST CANCER: ICD-10-CM

## 2021-12-29 DIAGNOSIS — Z00.00 ENCOUNTER FOR ANNUAL HEALTH EXAMINATION: Primary | ICD-10-CM

## 2021-12-29 DIAGNOSIS — N18.32 TYPE 2 DIABETES MELLITUS WITH STAGE 3B CHRONIC KIDNEY DISEASE, WITH LONG-TERM CURRENT USE OF INSULIN (HCC): ICD-10-CM

## 2021-12-29 DIAGNOSIS — Z79.02 ENCOUNTER FOR CURRENT LONG TERM USE OF ANTITHROMBOTIC DRUG: ICD-10-CM

## 2021-12-29 DIAGNOSIS — I10 ESSENTIAL HYPERTENSION: ICD-10-CM

## 2021-12-29 DIAGNOSIS — Z23 FLU VACCINE NEED: ICD-10-CM

## 2021-12-29 DIAGNOSIS — Z79.4 TYPE 2 DIABETES MELLITUS WITH STAGE 3B CHRONIC KIDNEY DISEASE, WITH LONG-TERM CURRENT USE OF INSULIN (HCC): ICD-10-CM

## 2021-12-29 DIAGNOSIS — I48.0 PAROXYSMAL ATRIAL FIBRILLATION (HCC): ICD-10-CM

## 2021-12-29 DIAGNOSIS — Z86.010 HISTORY OF COLONIC POLYPS: ICD-10-CM

## 2021-12-29 PROBLEM — Z79.899 ENCOUNTER FOR LONG-TERM (CURRENT) USE OF MEDICATIONS: Status: RESOLVED | Noted: 2020-01-17 | Resolved: 2021-12-29

## 2021-12-29 PROCEDURE — 99213 OFFICE O/P EST LOW 20 MIN: CPT | Performed by: FAMILY MEDICINE

## 2021-12-29 PROCEDURE — 90662 IIV NO PRSV INCREASED AG IM: CPT | Performed by: FAMILY MEDICINE

## 2021-12-29 PROCEDURE — G0439 PPPS, SUBSEQ VISIT: HCPCS | Performed by: FAMILY MEDICINE

## 2021-12-29 PROCEDURE — G0008 ADMIN INFLUENZA VIRUS VAC: HCPCS | Performed by: FAMILY MEDICINE

## 2021-12-29 RX ORDER — BLOOD-GLUCOSE METER
KIT MISCELLANEOUS
Qty: 100 STRIP | Refills: 3 | Status: SHIPPED | OUTPATIENT
Start: 2021-12-29

## 2021-12-29 NOTE — PATIENT INSTRUCTIONS
Continue the same medications. Flu shot today. Recheck in 6 months. Recommended Websites for Advanced Directives    [email or web address].no. org/publications/Documents/personal_dec. pdf  An information packet, including necessary form from the [employer]

## 2021-12-29 NOTE — PROGRESS NOTES
REASON FOR VISIT:    Lizz Thompson is a 78year old female who presents for a Medicare Annual Wellness visit. She is here for her annual wellness visit. She reports that she is feeling weaker than she had been.   She said that it is more difficult Oral Tab TAKE 1 TABLET DAILY        **[facility] MFR** 90 tablet 1   • DILTIAZEM 120 MG Oral Capsule SR 24 Hr TAKE 1 CAPSULE DAILY 90 capsule 1   • Levothyroxine Sodium 88 MCG Oral Tab Take 1 tablet (88 mcg total) by mouth before breakfast. 90 tablet 1   • ap AST 15 - 37 U/L 13(L) 15 14(L) 13(L) 20 12(L) 18   ALT 13 - 56 U/L 19 21 24 21 16 21 25     TSH and Free T4 Latest Ref Rng & Units 6/16/2021 12/1/2020 10/16/2020 8/3/2020 6/15/2020 4/29/2020 6/6/2019   TSH 0.358 - 3.740 mIU/mL 1.540 3.110 9.740(H) 10.400 Correct  Recall \"Flag\": Correct  Recall \"Tree\": Correct         PREVENTATIVE SERVICES   INDICATIONS AND SCHEDULE Internal Lab or Procedure   Diabetes Screening     HbgA1C   Annually HgbA1C (%)   Date Value   12/20/2021 8.2 (H)       Fasting Blood Sugar Antibiotics           Comment:Other reaction(s): developed rash  MEDICAL INFORMATION:   Past Medical History:   Diagnosis Date   • Age-related nuclear cataract of both eyes 11/4/2019   • Atrial fibrillation (HCC)     Intermittent   • COPD (chronic obstruct denies back pain  NEURO: She denies any numbness or tingling but said that she does feel a little bit weaker than she had been previously.   PSYCHE: denies depression or anxiety  HEMATOLOGIC: denies hx of anemia  ENDOCRINE: She has had 2 low blood sugar santhosh today.    2. Type 2 diabetes mellitus with stage 3b chronic kidney disease, with long-term current use of insulin (HCC)  Her hemoglobin A1c is up to 8.2. Impression: Worsening control of her diabetes. Plan: Continue the same medications.   Work on [employer] Continue the same medications and continue to watch the weight daily.  - OFFICE/OUTPT VISIT,EST[place] III  - COMP METABOLIC PANEL (14); Future  - CBC WITH DIFFERENTIAL WITH PLATELET; Future  - HEMOGLOBIN A1C; Future  - LIPID PANEL;  Future  - MICROALB/CREAT counseling perfomed    SUGGESTED VACCINATIONS - Influenza, Pneumococcal, Zoster, Tetanus   Influenza: Influenza Vaccine(1) due on 10/01/2021  Pneumonia: No recommendations at this time  Shingrix shingles vaccine is due

## 2022-01-06 NOTE — TELEPHONE ENCOUNTER
Future appt:     Your appointments     Date & Time Appointment Department [facility])    Jun 22, 2022  8:15 AM CDT Laboratory Visit with REF Gwen Manna Reference Lab ([facility] Ref [facility])        Jun 29, 2022  9:45 AM CDT Exam - Established with Juan Victor,

## 2022-01-07 RX ORDER — LEVOTHYROXINE SODIUM 88 UG/1
TABLET ORAL
Qty: 90 TABLET | Refills: 1 | Status: SHIPPED | OUTPATIENT
Start: 2022-01-07

## 2022-04-07 ENCOUNTER — TELEPHONE (OUTPATIENT)
Dept: FAMILY MEDICINE CLINIC | Facility: CLINIC | Age: 82
End: 2022-04-07

## 2022-04-07 NOTE — TELEPHONE ENCOUNTER
Rn from Dr. Emery Red office called wanting to know what pt allergic reaction to pravastatin is documented as he would like to prescribe a medication to pt. Advised severe muscle aches per allergy chart documentation.

## 2022-04-25 RX ORDER — PEN NEEDLE, DIABETIC 31 GX5/16"
NEEDLE, DISPOSABLE MISCELLANEOUS
Qty: 100 EACH | Refills: 3 | Status: SHIPPED | OUTPATIENT
Start: 2022-04-25

## 2022-04-25 NOTE — TELEPHONE ENCOUNTER
Future appt: Your appointments     Date & Time Appointment Department [facility])    Jun 22, 2022  8:15 AM CDT Laboratory Visit with REF Red Barges Reference Lab (EDW Ref Lab [place])        Jun 29, 2022  9:45 AM CDT Exam - Established with Gloria Carson MD 25 [facility], [place] ([facility])            25 [facility]  [place] [phone]  [facility] E [facility] Reference Lab  EDW Ref Lab [place]  [place] [phone]  [phone]        Last Appointment with provider:   12/29/2021  Last appointment at [facility] [place]:  12/29/2021  Cholesterol, Total (mg/dL)   Date Value   06/16/2021 194     HDL Cholesterol (mg/dL)   Date Value   06/16/2021 69 (H)     LDL Cholesterol (mg/dL)   Date Value   06/16/2021 108 (H)     Triglycerides (mg/dL)   Date Value   06/16/2021 98     Lab Results   Component Value Date     (H) 12/20/2021    A1C 8.2 (H) 12/20/2021     Lab Results   Component Value Date    T4F 2.1 (H) 12/01/2020    TSH 1.540 06/16/2021       No follow-ups on file.

## 2022-05-04 ENCOUNTER — TELEPHONE (OUTPATIENT)
Dept: FAMILY MEDICINE CLINIC | Facility: CLINIC | Age: 82
End: 2022-05-04

## 2022-05-04 ENCOUNTER — OFFICE VISIT (OUTPATIENT)
Dept: FAMILY MEDICINE CLINIC | Facility: CLINIC | Age: 82
End: 2022-05-04
Payer: MEDICARE

## 2022-05-04 VITALS
DIASTOLIC BLOOD PRESSURE: 72 MMHG | OXYGEN SATURATION: 96 % | HEART RATE: 86 BPM | RESPIRATION RATE: 16 BRPM | SYSTOLIC BLOOD PRESSURE: 112 MMHG | TEMPERATURE: 97 F | HEIGHT: 59 IN | WEIGHT: 147 LBS | BODY MASS INDEX: 29.64 KG/M2
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
## 2022-05-04 DIAGNOSIS — S61.239A PUNCTURE WOUND OF FINGER, INITIAL ENCOUNTER: Primary | ICD-10-CM

## 2022-05-04 PROCEDURE — 90714 TD VACC NO PRESV 7 YRS+ IM: CPT | Performed by: NURSE PRACTITIONER

## 2022-05-04 PROCEDURE — 99213 OFFICE O/P EST LOW 20 MIN: CPT | Performed by: NURSE PRACTITIONER

## 2022-05-04 PROCEDURE — 90471 IMMUNIZATION ADMIN: CPT | Performed by: NURSE PRACTITIONER

## 2022-05-04 RX ORDER — ATORVASTATIN CALCIUM 20 MG/1
20 TABLET, FILM COATED ORAL DAILY
COMMUNITY
Start: 2022-04-07

## 2022-05-04 RX ORDER — AMOXICILLIN AND CLAVULANATE POTASSIUM 875; 125 MG/1; MG/1
1 TABLET, FILM COATED ORAL 2 TIMES DAILY
Qty: 20 TABLET | Refills: 0 | Status: SHIPPED | OUTPATIENT
Start: 2022-05-04 | End: 2022-05-14

## 2022-05-04 NOTE — TELEPHONE ENCOUNTER
Patient bitten by a mouse today trying to take it off a sticky trap. Appt given for evaluation/treatment this morning. Future appt: Your appointments     Date & Time Appointment Department [facility])    May 04, 2022  9:30 AM CDT Exam - Established with Timmy Orozco, [address], [place] ([facility])        Jun 22, 2022  8:15 AM CDT Laboratory Visit with REF Renny Vila Reference Lab (EDW Ref Lab [place])        Jun 29, 2022  9:45 AM CDT Exam - Established with [facility] [place])        Dec 28, 2022  9:00 AM CST Medicare Annual Well Visit with Aidan Cuenca MD [address] [place], [place] [address]Elizabeth ([facility])            [address] [place] [address]  [place] [phone]  [facility] E [facility] Reference Lab  EDW Ref Lab [place]  [place] [phone]  [phone]        Last Appointment with provider:   12/29/2021  Last appointment at [facility] [place]:  12/29/2021  Cholesterol, Total (mg/dL)   Date Value   06/16/2021 194     HDL Cholesterol (mg/dL)   Date Value   06/16/2021 69 (H)     LDL Cholesterol (mg/dL)   Date Value   06/16/2021 108 (H)     Triglycerides (mg/dL)   Date Value   06/16/2021 98     Lab Results   Component Value Date     (H) 12/20/2021    A1C 8.2 (H) 12/20/2021     Lab Results   Component Value Date    T4F 2.1 (H) 12/01/2020    TSH 1.540 06/16/2021       No follow-ups on file.

## 2022-05-23 RX ORDER — INSULIN GLARGINE 100 [IU]/ML
10 INJECTION, SOLUTION SUBCUTANEOUS DAILY
Qty: 10 EACH | Refills: 3 | Status: SHIPPED | OUTPATIENT
Start: 2022-05-23

## 2022-05-23 NOTE — TELEPHONE ENCOUNTER
Patient was using 's remaining  Basaglar Rx. Basaglar: 6/23/20    Future appt: Your appointments     Date & Time Appointment Department [facility])    Jun 22, 2022  8:15 AM CDT Laboratory Visit with REF Maik La Reference Lab (EDW Ref Lab [place])        Jun 29, 2022  9:45 AM CDT Exam - Established with José Antonio Cornejo MD 25 [facility] [place])        Dec 28, 2022  9:00 AM CST Medicare Annual Well Visit with José Antonio Cornejo MD 25 [facility] ([facility])            [address] [place] [address]  [place] [facility] [phone]  [facility] E [facility] Reference Lab  EDW Ref Lab [place]  [address] [phone]  [phone]        Last Appointment with provider:   12/29/2021  [place] [facility]  5/4/2022  Cholesterol, Total (mg/dL)   Date Value   06/16/2021 194     HDL Cholesterol (mg/dL)   Date Value   06/16/2021 69 (H)     LDL Cholesterol (mg/dL)   Date Value   06/16/2021 108 (H)     Triglycerides (mg/dL)   Date Value   06/16/2021 98     Lab Results   Component Value Date     (H) 12/20/2021    A1C 8.2 (H) 12/20/2021     Lab Results   Component Value Date    T4F 2.1 (H) 12/01/2020    TSH 1.540 06/16/2021       No follow-ups on file.

## 2022-06-03 RX ORDER — ENALAPRIL MALEATE 10 MG/1
TABLET ORAL
Qty: 90 TABLET | Refills: 1 | Status: SHIPPED | OUTPATIENT
Start: 2022-06-03

## 2022-06-03 RX ORDER — BISOPROLOL FUMARATE AND HYDROCHLOROTHIAZIDE 6.25; 5 MG/1; MG/1
TABLET ORAL
Qty: 90 TABLET | Refills: 1 | Status: SHIPPED | OUTPATIENT
Start: 2022-06-03

## 2022-06-03 NOTE — TELEPHONE ENCOUNTER
Future appt: Your appointments     Date & Time Appointment Department [facility])    Jun 22, 2022  8:15 AM CDT Laboratory Visit with REF Deanna Mathews Reference Lab (EDW Ref Lab [place])        Jun 29, 2022  9:45 AM CDT Exam - Established with Shine Cheatham MD 25 [facility] [address] [place] [address] Group [place])        Dec 28, 2022  9:00 AM [facility] Medicare Annual Well Visit with Shine Cheatham MD 25 [facility] ([facility])            25 [facility]  [address] [phone]  250 [facility] Reference Lab  EDW Ref Lab [place]  [address] [address] [address]  [phone]        Last Appointment with provider:   12/29/2021  Last appointment at [facility]:  5/4/2022  Cholesterol, Total (mg/dL)   Date Value   06/16/2021 194     HDL Cholesterol (mg/dL)   Date Value   06/16/2021 69 (H)     LDL Cholesterol (mg/dL)   Date Value   06/16/2021 108 (H)     Triglycerides (mg/dL)   Date Value   06/16/2021 98     Lab Results   Component Value Date     (H) 12/20/2021    A1C 8.2 (H) 12/20/2021     Lab Results   Component Value Date    T4F 2.1 (H) 12/01/2020    TSH 1.540 06/16/2021     Last RF:  12/3/2021    No follow-ups on file.

## 2022-06-06 ENCOUNTER — TELEPHONE (OUTPATIENT)
Dept: FAMILY MEDICINE CLINIC | Facility: CLINIC | Age: 82
End: 2022-06-06

## 2022-06-06 NOTE — TELEPHONE ENCOUNTER
Patient has completed antibiotic for a mouse bite. Patient states over the weekend the bottoms   Of Her hands/feet are completley peeling. Questions why that would happen. Please advise.   Rita Brennan CMA, 06/06/22, 10:23 AM

## 2022-06-06 NOTE — TELEPHONE ENCOUNTER
Peeling of the hands and feet can be a reaction to the antibiotic. It is not a true allergic reaction and does not mean we cannot use the antibiotic again in the future. The peeling should resolve on its own and no additional testing or treatment is needed at this time.

## 2022-06-21 DIAGNOSIS — E11.22 TYPE 2 DIABETES MELLITUS WITH STAGE 3B CHRONIC KIDNEY DISEASE, WITH LONG-TERM CURRENT USE OF INSULIN (HCC): ICD-10-CM

## 2022-06-21 DIAGNOSIS — N18.32 TYPE 2 DIABETES MELLITUS WITH STAGE 3B CHRONIC KIDNEY DISEASE, WITH LONG-TERM CURRENT USE OF INSULIN (HCC): ICD-10-CM

## 2022-06-21 DIAGNOSIS — Z79.4 TYPE 2 DIABETES MELLITUS WITH STAGE 3B CHRONIC KIDNEY DISEASE, WITH LONG-TERM CURRENT USE OF INSULIN (HCC): ICD-10-CM

## 2022-06-21 NOTE — TELEPHONE ENCOUNTER
Metformin: 12/29/21    Future appt: Your appointments     Date & Time Appointment Department [facility])    Jun 22, 2022  8:15 AM CDT Laboratory Visit with REF Aminta Nielson Reference Lab (EDW Ref Lab [place])        Jun 29, 2022  9:45 AM CDT Exam - Established with Adalgisa Ferreira MD 25 [facility] [place])        Dec 28, 2022  9:00 AM [facility] Medicare Annual Well Visit with Adalgisa Ferreira MD 25 [facility] ([facility])            25 [facility]  [address] [phone]  250 [facility] Reference Lab  EDW Ref Lab [place]  [address] [phone]  [phone]        Last Appointment with provider:   12/29/2021  Last appointment at [facility] [place]:  5/4/2022  Cholesterol, Total (mg/dL)   Date Value   06/16/2021 194     HDL Cholesterol (mg/dL)   Date Value   06/16/2021 69 (H)     LDL Cholesterol (mg/dL)   Date Value   06/16/2021 108 (H)     Triglycerides (mg/dL)   Date Value   06/16/2021 98     Lab Results   Component Value Date     (H) 12/20/2021    A1C 8.2 (H) 12/20/2021     Lab Results   Component Value Date    T4F 2.1 (H) 12/01/2020    TSH 1.540 06/16/2021       No follow-ups on file.

## 2022-06-21 NOTE — TELEPHONE ENCOUNTER
Asking if she can get a refill on her Metformin 1000 mcg.   Pharmacy:  [facility]   Future Appointments   Date Time Provider Jacey Gonzalezi   6/22/2022  8:15 AM REF [place] REF [facility] Ref Syc   6/29/2022  9:45 AM Kaylee White MD [facility] [place] [facility] [place]   12/28/2022  9:00 AM Kaylee White MD [facility] [place] [facility]

## 2022-06-22 ENCOUNTER — LABORATORY ENCOUNTER (OUTPATIENT)
Dept: LAB | Age: 82
End: 2022-06-22
Attending: FAMILY MEDICINE
Payer: MEDICARE

## 2022-06-22 DIAGNOSIS — E78.49 FAMILIAL MULTIPLE LIPOPROTEIN-TYPE HYPERLIPIDEMIA: ICD-10-CM

## 2022-06-22 DIAGNOSIS — I10 ESSENTIAL HYPERTENSION: ICD-10-CM

## 2022-06-22 DIAGNOSIS — N18.32 TYPE 2 DIABETES MELLITUS WITH STAGE 3B CHRONIC KIDNEY DISEASE, WITH LONG-TERM CURRENT USE OF INSULIN (HCC): ICD-10-CM

## 2022-06-22 DIAGNOSIS — E11.22 TYPE 2 DIABETES MELLITUS WITH STAGE 3B CHRONIC KIDNEY DISEASE, WITH LONG-TERM CURRENT USE OF INSULIN (HCC): ICD-10-CM

## 2022-06-22 DIAGNOSIS — Z79.4 TYPE 2 DIABETES MELLITUS WITH STAGE 3B CHRONIC KIDNEY DISEASE, WITH LONG-TERM CURRENT USE OF INSULIN (HCC): ICD-10-CM

## 2022-06-22 DIAGNOSIS — E03.9 ACQUIRED HYPOTHYROIDISM: ICD-10-CM

## 2022-06-22 DIAGNOSIS — I50.32 CHRONIC DIASTOLIC HEART FAILURE (HCC): ICD-10-CM

## 2022-06-22 LAB
ALBUMIN SERPL-MCNC: 3.7 G/DL (ref 3.4–5)
ALBUMIN/GLOB SERPL: 1 {RATIO} (ref 1–2)
ALP LIVER SERPL-CCNC: 73 U/L
ALT SERPL-CCNC: 27 U/L
ANION GAP SERPL CALC-SCNC: 8 MMOL/L (ref 0–18)
AST SERPL-CCNC: 24 U/L (ref 15–37)
BASOPHILS # BLD AUTO: 0.11 X10(3) UL (ref 0–0.2)
BASOPHILS NFR BLD AUTO: 1.6 %
BILIRUB SERPL-MCNC: 0.9 MG/DL (ref 0.1–2)
BUN BLD-MCNC: 17 MG/DL (ref 7–18)
CALCIUM BLD-MCNC: 9.3 MG/DL (ref 8.5–10.1)
CHLORIDE SERPL-SCNC: 102 MMOL/L (ref 98–112)
CHOLEST SERPL-MCNC: 124 MG/DL (ref ?–200)
CO2 SERPL-SCNC: 29 MMOL/L (ref 21–32)
CREAT BLD-MCNC: 1.14 MG/DL
CREAT UR-SCNC: 276 MG/DL
EOSINOPHIL # BLD AUTO: 0.41 X10(3) UL (ref 0–0.7)
EOSINOPHIL NFR BLD AUTO: 6 %
ERYTHROCYTE [DISTWIDTH] IN BLOOD BY AUTOMATED COUNT: 13.3 %
EST. AVERAGE GLUCOSE BLD GHB EST-MCNC: 214 MG/DL (ref 68–126)
FASTING PATIENT LIPID ANSWER: YES
FASTING STATUS PATIENT QL REPORTED: YES
GLOBULIN PLAS-MCNC: 3.7 G/DL (ref 2.8–4.4)
GLUCOSE BLD-MCNC: 209 MG/DL (ref 70–99)
HBA1C MFR BLD: 9.1 % (ref ?–5.7)
HCT VFR BLD AUTO: 40.1 %
HDLC SERPL-MCNC: 59 MG/DL (ref 40–59)
HGB BLD-MCNC: 12.9 G/DL
IMM GRANULOCYTES # BLD AUTO: 0.01 X10(3) UL (ref 0–1)
IMM GRANULOCYTES NFR BLD: 0.1 %
LDLC SERPL CALC-MCNC: 49 MG/DL (ref ?–100)
LYMPHOCYTES # BLD AUTO: 1.84 X10(3) UL (ref 1–4)
LYMPHOCYTES NFR BLD AUTO: 26.9 %
MCH RBC QN AUTO: 30.2 PG (ref 26–34)
MCHC RBC AUTO-ENTMCNC: 32.2 G/DL (ref 31–37)
MCV RBC AUTO: 93.9 FL
MICROALBUMIN UR-MCNC: 8.67 MG/DL
MICROALBUMIN/CREAT 24H UR-RTO: 31.4 UG/MG (ref ?–30)
MONOCYTES # BLD AUTO: 0.59 X10(3) UL (ref 0.1–1)
MONOCYTES NFR BLD AUTO: 8.6 %
NEUTROPHILS # BLD AUTO: 3.88 X10 (3) UL (ref 1.5–7.7)
NEUTROPHILS # BLD AUTO: 3.88 X10(3) UL (ref 1.5–7.7)
NEUTROPHILS NFR BLD AUTO: 56.8 %
NONHDLC SERPL-MCNC: 65 MG/DL (ref ?–130)
OSMOLALITY SERPL CALC.SUM OF ELEC: 296 MOSM/KG (ref 275–295)
PLATELET # BLD AUTO: 290 10(3)UL (ref 150–450)
POTASSIUM SERPL-SCNC: 4 MMOL/L (ref 3.5–5.1)
PROT SERPL-MCNC: 7.4 G/DL (ref 6.4–8.2)
RBC # BLD AUTO: 4.27 X10(6)UL
SODIUM SERPL-SCNC: 139 MMOL/L (ref 136–145)
TRIGL SERPL-MCNC: 85 MG/DL (ref 30–149)
TSI SER-ACNC: 0.38 MIU/ML (ref 0.36–3.74)
URATE SERPL-MCNC: 7.3 MG/DL
VLDLC SERPL CALC-MCNC: 12 MG/DL (ref 0–30)
WBC # BLD AUTO: 6.8 X10(3) UL (ref 4–11)

## 2022-06-22 PROCEDURE — 84443 ASSAY THYROID STIM HORMONE: CPT

## 2022-06-22 PROCEDURE — 82570 ASSAY OF URINE CREATININE: CPT

## 2022-06-22 PROCEDURE — 36415 COLL VENOUS BLD VENIPUNCTURE: CPT

## 2022-06-22 PROCEDURE — 84550 ASSAY OF BLOOD/URIC ACID: CPT

## 2022-06-22 PROCEDURE — 85025 COMPLETE CBC W/AUTO DIFF WBC: CPT

## 2022-06-22 PROCEDURE — 82043 UR ALBUMIN QUANTITATIVE: CPT

## 2022-06-22 PROCEDURE — 80061 LIPID PANEL: CPT

## 2022-06-22 PROCEDURE — 83036 HEMOGLOBIN GLYCOSYLATED A1C: CPT

## 2022-06-22 PROCEDURE — 80053 COMPREHEN METABOLIC PANEL: CPT

## 2022-06-22 RX ORDER — LEVOTHYROXINE SODIUM 88 UG/1
TABLET ORAL
Qty: 90 TABLET | Refills: 1 | Status: SHIPPED | OUTPATIENT
Start: 2022-06-22

## 2022-06-22 NOTE — TELEPHONE ENCOUNTER
Levothyroxine: 1/7/22    Future appt: Your appointments     Date & Time Appointment Department [facility])    Jun 29, 2022  9:45 AM CDT Exam - Established with Chavo Rausch MD 25 [facility], [place] ([facility])        Dec 28, 2022  9:00 AM CST Medicare Annual Well Visit with Chavo Rausch MD [address] [place], [place] [address])            [address] [place] [address] 1076 [phone]  [phone]        Last Appointment with provider:   12/29/2021  [facility] [place]:  5/4/2022  Cholesterol, Total (mg/dL)   Date Value   06/16/2021 194     HDL Cholesterol (mg/dL)   Date Value   06/16/2021 69 (H)     LDL Cholesterol (mg/dL)   Date Value   06/16/2021 108 (H)     Triglycerides (mg/dL)   Date Value   06/16/2021 98     Lab Results   Component Value Date     (H) 12/20/2021    A1C 8.2 (H) 12/20/2021     Lab Results   Component Value Date    T4F 2.1 (H) 12/01/2020    TSH 1.540 06/16/2021       No follow-ups on file.

## 2022-06-29 ENCOUNTER — OFFICE VISIT (OUTPATIENT)
Dept: FAMILY MEDICINE CLINIC | Facility: CLINIC | Age: 82
End: 2022-06-29
Payer: MEDICARE

## 2022-06-29 VITALS
HEIGHT: 59 IN | OXYGEN SATURATION: 97 % | DIASTOLIC BLOOD PRESSURE: 70 MMHG | RESPIRATION RATE: 16 BRPM | BODY MASS INDEX: 28.83 KG/M2 | HEART RATE: 67 BPM | TEMPERATURE: 98 F | SYSTOLIC BLOOD PRESSURE: 100 MMHG | WEIGHT: 143 LBS

## 2022-06-29 DIAGNOSIS — N18.32 STAGE 3B CHRONIC KIDNEY DISEASE (HCC): ICD-10-CM

## 2022-06-29 DIAGNOSIS — Z79.4 TYPE 2 DIABETES MELLITUS WITH STAGE 3B CHRONIC KIDNEY DISEASE, WITH LONG-TERM CURRENT USE OF INSULIN (HCC): Primary | ICD-10-CM

## 2022-06-29 DIAGNOSIS — I10 ESSENTIAL HYPERTENSION: ICD-10-CM

## 2022-06-29 DIAGNOSIS — I48.0 PAROXYSMAL ATRIAL FIBRILLATION (HCC): ICD-10-CM

## 2022-06-29 DIAGNOSIS — E03.9 ACQUIRED HYPOTHYROIDISM: ICD-10-CM

## 2022-06-29 DIAGNOSIS — J41.0 SIMPLE CHRONIC BRONCHITIS (HCC): ICD-10-CM

## 2022-06-29 DIAGNOSIS — N18.32 TYPE 2 DIABETES MELLITUS WITH STAGE 3B CHRONIC KIDNEY DISEASE, WITH LONG-TERM CURRENT USE OF INSULIN (HCC): Primary | ICD-10-CM

## 2022-06-29 DIAGNOSIS — E11.22 TYPE 2 DIABETES MELLITUS WITH STAGE 3B CHRONIC KIDNEY DISEASE, WITH LONG-TERM CURRENT USE OF INSULIN (HCC): Primary | ICD-10-CM

## 2022-06-29 PROCEDURE — 99214 OFFICE O/P EST MOD 30 MIN: CPT | Performed by: FAMILY MEDICINE

## 2022-06-29 RX ORDER — INSULIN GLARGINE 100 [IU]/ML
14 INJECTION, SOLUTION SUBCUTANEOUS DAILY
Qty: 36 ML | Refills: 3 | Status: SHIPPED | OUTPATIENT
Start: 2022-06-29

## 2022-08-23 NOTE — TELEPHONE ENCOUNTER
Future appt: Your appointments     Date & Time Appointment Department [facility])    Dec 23, 2022  8:00 AM CST Laboratory Visit with REF Germain Hurley Reference Lab (EDW Ref Lab [place])        Dec 28, 2022  9:00 AM CST Medicare Annual Well Visit with Ann Brasher MD 25 [facility], [place] ([facility])            25 [facility] [place]  [address] [phone]  [address] Ref Lab [place]  [place] [phone]  [phone]        Last Appointment with provider:   6/29/2022  [place] [facility] [place]:  6/29/2022  Cholesterol, Total (mg/dL)   Date Value   06/22/2022 124     HDL Cholesterol (mg/dL)   Date Value   06/22/2022 59     LDL Cholesterol (mg/dL)   Date Value   06/22/2022 49     Triglycerides (mg/dL)   Date Value   06/22/2022 85     Lab Results   Component Value Date     (H) 06/22/2022    A1C 9.1 (H) 06/22/2022     Lab Results   Component Value Date    T4F 2.1 (H) 12/01/2020    TSH 0.379 06/22/2022       No follow-ups on file.

## 2022-08-23 NOTE — TELEPHONE ENCOUNTER
patient requesting refill of eliquis, will be out of medication in one week, uses [facility]

## 2022-11-21 RX ORDER — ENALAPRIL MALEATE 10 MG/1
TABLET ORAL
Qty: 90 TABLET | Refills: 1 | Status: SHIPPED | OUTPATIENT
Start: 2022-11-21

## 2022-11-21 RX ORDER — BISOPROLOL FUMARATE AND HYDROCHLOROTHIAZIDE 5; 6.25 MG/1; MG/1
TABLET ORAL
Qty: 90 TABLET | Refills: 1 | Status: SHIPPED | OUTPATIENT
Start: 2022-11-21

## 2022-11-21 NOTE — TELEPHONE ENCOUNTER
Bisoprolol-HCtZ/  Enalpril: 6/3/22    Future appt: Your appointments     Date & Time Appointment Department [facility])    Dec 23, 2022  8:00 AM CST what is this with REF [place] Vivian Cottrell [place] (EDW Ref Lab [place])        Dec 28, 2022  9:00 AM CST Medicare Annual Well Visit with Nany Headley MD 25 [facility], [place] ([facility])            Vivian Cottrell [place]  EDW Ref Lab [place]  69 [facility] [address]  [facility] [place]  [address] [address] [phone]  [phone]        Last Appointment with provider:   6/29/2022  Last appointment [facility] [place]:  6/29/2022  Cholesterol, Total (mg/dL)   Date Value   06/22/2022 124     HDL Cholesterol (mg/dL)   Date Value   06/22/2022 59     LDL Cholesterol (mg/dL)   Date Value   06/22/2022 49     Triglycerides (mg/dL)   Date Value   06/22/2022 85     Lab Results   Component Value Date     (H) 06/22/2022    A1C 9.1 (H) 06/22/2022     Lab Results   Component Value Date    T4F 2.1 (H) 12/01/2020    TSH 0.379 06/22/2022       No follow-ups on file.

## 2022-12-12 DIAGNOSIS — E11.22 TYPE 2 DIABETES MELLITUS WITH STAGE 3B CHRONIC KIDNEY DISEASE, WITH LONG-TERM CURRENT USE OF INSULIN (HCC): ICD-10-CM

## 2022-12-12 DIAGNOSIS — Z79.4 TYPE 2 DIABETES MELLITUS WITH STAGE 3B CHRONIC KIDNEY DISEASE, WITH LONG-TERM CURRENT USE OF INSULIN (HCC): ICD-10-CM

## 2022-12-12 DIAGNOSIS — N18.32 TYPE 2 DIABETES MELLITUS WITH STAGE 3B CHRONIC KIDNEY DISEASE, WITH LONG-TERM CURRENT USE OF INSULIN (HCC): ICD-10-CM

## 2022-12-12 NOTE — TELEPHONE ENCOUNTER
Last refill 6/21/22 #180 w/1 refill    Future appt: Your appointments     Date & Time Appointment Department [facility])    Dec 23, 2022  8:00 AM CST what is this with REF [place] Mimi Kehr, [place], [place] (EDW Ref Lab [place])        Dec 28, 2022  9:00 AM CST Medicare Annual Well Visit with Ca Rios MD 25 [facility] ([facility])            Mimi Kehr, [place], [place]  EDW Ref Lab [place]  69 [facility] [address]  435 [facility] [place]  [address] [address] [phone]  [phone]        Last Appointment with provider:   6/29/2022  Last appointment at [facility] [place]:  6/29/2022  Cholesterol, Total (mg/dL)   Date Value   06/22/2022 124     HDL Cholesterol (mg/dL)   Date Value   06/22/2022 59     LDL Cholesterol (mg/dL)   Date Value   06/22/2022 49     Triglycerides (mg/dL)   Date Value   06/22/2022 85     Lab Results   Component Value Date     (H) 06/22/2022    A1C 9.1 (H) 06/22/2022     Lab Results   Component Value Date    T4F 2.1 (H) 12/01/2020    TSH 0.379 06/22/2022       No follow-ups on file.

## 2022-12-20 RX ORDER — LEVOTHYROXINE SODIUM 88 UG/1
TABLET ORAL
Qty: 90 TABLET | Refills: 1 | Status: SHIPPED | OUTPATIENT
Start: 2022-12-20

## 2022-12-20 NOTE — TELEPHONE ENCOUNTER
Levothyroxine: 6/22/22    Future appt: Your appointments     Date & Time Appointment Department [facility])    Dec 23, 2022  8:00 AM CST what is this with REF [place] Vivian Brown [place] (EDW Ref Lab [place])        Dec 28, 2022  9:00 AM CST Medicare Annual Well Visit with Gómez Junior MD 25 [facility] [place] ([facility])            Vivian Brown [place]  EDW Ref Lab [place]  69 [facility] [address]  435 [facility] [place]  [place] [address] [phone]  [phone]        Last Appointment with provider:   6/29/2022  Last appointment at [facility] [place]:  6/29/2022  Cholesterol, Total (mg/dL)   Date Value   06/22/2022 124     HDL Cholesterol (mg/dL)   Date Value   06/22/2022 59     LDL Cholesterol (mg/dL)   Date Value   06/22/2022 49     Triglycerides (mg/dL)   Date Value   06/22/2022 85     Lab Results   Component Value Date     (H) 06/22/2022    A1C 9.1 (H) 06/22/2022     Lab Results   Component Value Date    T4F 2.1 (H) 12/01/2020    TSH 0.379 06/22/2022       No follow-ups on file.

## 2022-12-22 ENCOUNTER — LABORATORY ENCOUNTER (OUTPATIENT)
Dept: LAB | Age: 82
End: 2022-12-22
Attending: FAMILY MEDICINE
Payer: MEDICARE

## 2022-12-22 DIAGNOSIS — N18.32 TYPE 2 DIABETES MELLITUS WITH STAGE 3B CHRONIC KIDNEY DISEASE, WITH LONG-TERM CURRENT USE OF INSULIN (HCC): ICD-10-CM

## 2022-12-22 DIAGNOSIS — E11.22 TYPE 2 DIABETES MELLITUS WITH STAGE 3B CHRONIC KIDNEY DISEASE, WITH LONG-TERM CURRENT USE OF INSULIN (HCC): ICD-10-CM

## 2022-12-22 DIAGNOSIS — Z79.4 TYPE 2 DIABETES MELLITUS WITH STAGE 3B CHRONIC KIDNEY DISEASE, WITH LONG-TERM CURRENT USE OF INSULIN (HCC): ICD-10-CM

## 2022-12-22 LAB
ALBUMIN SERPL-MCNC: 3.6 G/DL (ref 3.4–5)
ALBUMIN/GLOB SERPL: 1 {RATIO} (ref 1–2)
ALP LIVER SERPL-CCNC: 67 U/L
ALT SERPL-CCNC: 36 U/L
ANION GAP SERPL CALC-SCNC: 4 MMOL/L (ref 0–18)
AST SERPL-CCNC: 29 U/L (ref 15–37)
BILIRUB SERPL-MCNC: 0.6 MG/DL (ref 0.1–2)
BUN BLD-MCNC: 19 MG/DL (ref 7–18)
CALCIUM BLD-MCNC: 9.7 MG/DL (ref 8.5–10.1)
CHLORIDE SERPL-SCNC: 107 MMOL/L (ref 98–112)
CO2 SERPL-SCNC: 30 MMOL/L (ref 21–32)
CREAT BLD-MCNC: 1.09 MG/DL
EST. AVERAGE GLUCOSE BLD GHB EST-MCNC: 177 MG/DL (ref 68–126)
FASTING STATUS PATIENT QL REPORTED: YES
GFR SERPLBLD BASED ON 1.73 SQ M-ARVRAT: 50 ML/MIN/1.73M2 (ref 60–?)
GLOBULIN PLAS-MCNC: 3.7 G/DL (ref 2.8–4.4)
GLUCOSE BLD-MCNC: 198 MG/DL (ref 70–99)
HBA1C MFR BLD: 7.8 % (ref ?–5.7)
OSMOLALITY SERPL CALC.SUM OF ELEC: 300 MOSM/KG (ref 275–295)
POTASSIUM SERPL-SCNC: 4.2 MMOL/L (ref 3.5–5.1)
PROT SERPL-MCNC: 7.3 G/DL (ref 6.4–8.2)
SODIUM SERPL-SCNC: 141 MMOL/L (ref 136–145)

## 2022-12-22 PROCEDURE — 83036 HEMOGLOBIN GLYCOSYLATED A1C: CPT

## 2022-12-22 PROCEDURE — 80053 COMPREHEN METABOLIC PANEL: CPT

## 2022-12-22 PROCEDURE — 36415 COLL VENOUS BLD VENIPUNCTURE: CPT

## 2022-12-28 ENCOUNTER — OFFICE VISIT (OUTPATIENT)
Dept: FAMILY MEDICINE CLINIC | Facility: CLINIC | Age: 82
End: 2022-12-28
Payer: MEDICARE

## 2022-12-28 VITALS
TEMPERATURE: 99 F | HEIGHT: 59 IN | WEIGHT: 139 LBS | BODY MASS INDEX: 28.02 KG/M2 | HEART RATE: 96 BPM | SYSTOLIC BLOOD PRESSURE: 136 MMHG | DIASTOLIC BLOOD PRESSURE: 88 MMHG | RESPIRATION RATE: 16 BRPM

## 2022-12-28 DIAGNOSIS — E78.49 FAMILIAL MULTIPLE LIPOPROTEIN-TYPE HYPERLIPIDEMIA: ICD-10-CM

## 2022-12-28 DIAGNOSIS — E11.22 TYPE 2 DIABETES MELLITUS WITH STAGE 3A CHRONIC KIDNEY DISEASE, WITH LONG-TERM CURRENT USE OF INSULIN (HCC): ICD-10-CM

## 2022-12-28 DIAGNOSIS — I48.0 PAROXYSMAL ATRIAL FIBRILLATION (HCC): ICD-10-CM

## 2022-12-28 DIAGNOSIS — Z79.02 ENCOUNTER FOR CURRENT LONG TERM USE OF ANTITHROMBOTIC DRUG: ICD-10-CM

## 2022-12-28 DIAGNOSIS — I50.32 CHRONIC DIASTOLIC HEART FAILURE (HCC): ICD-10-CM

## 2022-12-28 DIAGNOSIS — N18.31 STAGE 3A CHRONIC KIDNEY DISEASE (HCC): ICD-10-CM

## 2022-12-28 DIAGNOSIS — J41.0 SIMPLE CHRONIC BRONCHITIS (HCC): ICD-10-CM

## 2022-12-28 DIAGNOSIS — Z79.4 TYPE 2 DIABETES MELLITUS WITH STAGE 3A CHRONIC KIDNEY DISEASE, WITH LONG-TERM CURRENT USE OF INSULIN (HCC): ICD-10-CM

## 2022-12-28 DIAGNOSIS — N18.31 TYPE 2 DIABETES MELLITUS WITH STAGE 3A CHRONIC KIDNEY DISEASE, WITH LONG-TERM CURRENT USE OF INSULIN (HCC): ICD-10-CM

## 2022-12-28 DIAGNOSIS — I10 ESSENTIAL HYPERTENSION: ICD-10-CM

## 2022-12-28 DIAGNOSIS — E03.9 ACQUIRED HYPOTHYROIDISM: ICD-10-CM

## 2022-12-28 DIAGNOSIS — Z00.00 ENCOUNTER FOR ANNUAL HEALTH EXAMINATION: Primary | ICD-10-CM

## 2022-12-28 PROBLEM — M67.40 GANGLION CYST: Status: RESOLVED | Noted: 2020-12-21 | Resolved: 2022-12-28

## 2022-12-28 PROCEDURE — 99213 OFFICE O/P EST LOW 20 MIN: CPT | Performed by: FAMILY MEDICINE

## 2022-12-28 PROCEDURE — 1126F AMNT PAIN NOTED NONE PRSNT: CPT | Performed by: FAMILY MEDICINE

## 2022-12-28 PROCEDURE — G0439 PPPS, SUBSEQ VISIT: HCPCS | Performed by: FAMILY MEDICINE

## 2023-03-10 NOTE — TELEPHONE ENCOUNTER
Future appt: Your appointments     Date & Time Appointment Department [facility])    Jun 02, 2023  8:30 AM CDT what is this with REF SYCAM[place]ivian Miles [place] (EDW Ref Lab [place])        Jun 05, 2023 10:30 AM CDT Follow Up Visit with Tim Olguin [facility] [address] [facility])            Vivian Barrera [place]  EDW Ref Lab [place]  [place] [phone]  [address] [facility] [place]  [place] [address] [phone]  [phone]        Last Appointment with provider:   12/28/2022  Last appointment at [facility] [place]:  12/28/2022  Cholesterol, Total (mg/dL)   Date Value   06/22/2022 124     HDL Cholesterol (mg/dL)   Date Value   06/22/2022 59     LDL Cholesterol (mg/dL)   Date Value   06/22/2022 49     Triglycerides (mg/dL)   Date Value   06/22/2022 85     Lab Results   Component Value Date     (H) 12/22/2022    A1C 7.8 (H) 12/22/2022     Lab Results   Component Value Date    T4F 2.1 (H) 12/01/2020    TSH 0.379 06/22/2022       No follow-ups on file.

## 2023-03-13 RX ORDER — BLOOD-GLUCOSE METER
KIT MISCELLANEOUS
Qty: 100 STRIP | Refills: 3 | Status: SHIPPED | OUTPATIENT
Start: 2023-03-13

## 2023-05-22 RX ORDER — ENALAPRIL MALEATE 10 MG/1
TABLET ORAL
Qty: 90 TABLET | Refills: 1 | Status: SHIPPED | OUTPATIENT
Start: 2023-05-22

## 2023-05-22 RX ORDER — BISOPROLOL FUMARATE AND HYDROCHLOROTHIAZIDE 5; 6.25 MG/1; MG/1
TABLET ORAL
Qty: 90 TABLET | Refills: 1 | Status: SHIPPED | OUTPATIENT
Start: 2023-05-22

## 2023-05-22 NOTE — TELEPHONE ENCOUNTER
Enalapril/ Bisoprolol-HCtZ: 11/21/22    Future appt: Your appointments     Date & Time Appointment Department [facility])    Jun 02, 2023  8:30 AM CDT Laboratory Visit with REF 1 Mera Verduzco, [place] (EDW Ref Lab [place])        Jun 05, 2023 10:30 AM CDT Follow Up Visit with Joaquin Mccoy MD 8300 [facility] [place] ([facility])            Vivian Gonzales [place]  [facility]  [place] [address] 557 [facility] [place]  [place] [address] [phone]  [phone]        Last Appointment with provider:   12/28/2022  [facility] [place]:  12/28/2022  Cholesterol, Total (mg/dL)   Date Value   06/22/2022 124     HDL Cholesterol (mg/dL)   Date Value   06/22/2022 59     LDL Cholesterol (mg/dL)   Date Value   06/22/2022 49     Triglycerides (mg/dL)   Date Value   06/22/2022 85     Lab Results   Component Value Date     (H) 12/22/2022    A1C 7.8 (H) 12/22/2022     Lab Results   Component Value Date    T4F 2.1 (H) 12/01/2020    TSH 0.379 06/22/2022       No follow-ups on file.

## 2023-06-02 ENCOUNTER — LABORATORY ENCOUNTER (OUTPATIENT)
Dept: LAB | Age: 83
End: 2023-06-02
Attending: FAMILY MEDICINE
Payer: MEDICARE

## 2023-06-02 DIAGNOSIS — Z79.4 TYPE 2 DIABETES MELLITUS WITH STAGE 3A CHRONIC KIDNEY DISEASE, WITH LONG-TERM CURRENT USE OF INSULIN (HCC): ICD-10-CM

## 2023-06-02 DIAGNOSIS — I50.32 CHRONIC DIASTOLIC HEART FAILURE (HCC): ICD-10-CM

## 2023-06-02 DIAGNOSIS — E11.22 TYPE 2 DIABETES MELLITUS WITH STAGE 3A CHRONIC KIDNEY DISEASE, WITH LONG-TERM CURRENT USE OF INSULIN (HCC): ICD-10-CM

## 2023-06-02 DIAGNOSIS — N18.31 STAGE 3A CHRONIC KIDNEY DISEASE (HCC): ICD-10-CM

## 2023-06-02 DIAGNOSIS — E78.49 FAMILIAL MULTIPLE LIPOPROTEIN-TYPE HYPERLIPIDEMIA: ICD-10-CM

## 2023-06-02 DIAGNOSIS — I48.0 PAROXYSMAL ATRIAL FIBRILLATION (HCC): ICD-10-CM

## 2023-06-02 DIAGNOSIS — I10 ESSENTIAL HYPERTENSION: ICD-10-CM

## 2023-06-02 DIAGNOSIS — E03.9 ACQUIRED HYPOTHYROIDISM: ICD-10-CM

## 2023-06-02 DIAGNOSIS — N18.31 TYPE 2 DIABETES MELLITUS WITH STAGE 3A CHRONIC KIDNEY DISEASE, WITH LONG-TERM CURRENT USE OF INSULIN (HCC): ICD-10-CM

## 2023-06-02 LAB
ALBUMIN SERPL-MCNC: 3.7 G/DL (ref 3.4–5)
ALBUMIN/GLOB SERPL: 1.1 {RATIO} (ref 1–2)
ALP LIVER SERPL-CCNC: 70 U/L
ALT SERPL-CCNC: 31 U/L
ANION GAP SERPL CALC-SCNC: 3 MMOL/L (ref 0–18)
AST SERPL-CCNC: 21 U/L (ref 15–37)
BASOPHILS # BLD AUTO: 0.08 X10(3) UL (ref 0–0.2)
BASOPHILS NFR BLD AUTO: 1.1 %
BILIRUB SERPL-MCNC: 0.7 MG/DL (ref 0.1–2)
BUN BLD-MCNC: 17 MG/DL (ref 7–18)
CALCIUM BLD-MCNC: 9.2 MG/DL (ref 8.5–10.1)
CHLORIDE SERPL-SCNC: 108 MMOL/L (ref 98–112)
CHOLEST SERPL-MCNC: 112 MG/DL (ref ?–200)
CO2 SERPL-SCNC: 27 MMOL/L (ref 21–32)
CREAT BLD-MCNC: 0.98 MG/DL
CREAT UR-SCNC: 129 MG/DL
EOSINOPHIL # BLD AUTO: 0.33 X10(3) UL (ref 0–0.7)
EOSINOPHIL NFR BLD AUTO: 4.7 %
ERYTHROCYTE [DISTWIDTH] IN BLOOD BY AUTOMATED COUNT: 13.2 %
EST. AVERAGE GLUCOSE BLD GHB EST-MCNC: 200 MG/DL (ref 68–126)
FASTING PATIENT LIPID ANSWER: YES
FASTING STATUS PATIENT QL REPORTED: YES
GFR SERPLBLD BASED ON 1.73 SQ M-ARVRAT: 57 ML/MIN/1.73M2 (ref 60–?)
GLOBULIN PLAS-MCNC: 3.4 G/DL (ref 2.8–4.4)
GLUCOSE BLD-MCNC: 137 MG/DL (ref 70–99)
HBA1C MFR BLD: 8.6 % (ref ?–5.7)
HCT VFR BLD AUTO: 42 %
HDLC SERPL-MCNC: 62 MG/DL (ref 40–59)
HGB BLD-MCNC: 13.4 G/DL
IMM GRANULOCYTES # BLD AUTO: 0.01 X10(3) UL (ref 0–1)
IMM GRANULOCYTES NFR BLD: 0.1 %
LDLC SERPL CALC-MCNC: 36 MG/DL (ref ?–100)
LYMPHOCYTES # BLD AUTO: 2.1 X10(3) UL (ref 1–4)
LYMPHOCYTES NFR BLD AUTO: 29.7 %
MCH RBC QN AUTO: 30.7 PG (ref 26–34)
MCHC RBC AUTO-ENTMCNC: 31.9 G/DL (ref 31–37)
MCV RBC AUTO: 96.1 FL
MICROALBUMIN UR-MCNC: 2.37 MG/DL
MICROALBUMIN/CREAT 24H UR-RTO: 18.4 UG/MG (ref ?–30)
MONOCYTES # BLD AUTO: 0.59 X10(3) UL (ref 0.1–1)
MONOCYTES NFR BLD AUTO: 8.4 %
NEUTROPHILS # BLD AUTO: 3.95 X10 (3) UL (ref 1.5–7.7)
NEUTROPHILS # BLD AUTO: 3.95 X10(3) UL (ref 1.5–7.7)
NEUTROPHILS NFR BLD AUTO: 56 %
NONHDLC SERPL-MCNC: 50 MG/DL (ref ?–130)
OSMOLALITY SERPL CALC.SUM OF ELEC: 290 MOSM/KG (ref 275–295)
PLATELET # BLD AUTO: 290 10(3)UL (ref 150–450)
POTASSIUM SERPL-SCNC: 4 MMOL/L (ref 3.5–5.1)
PROT SERPL-MCNC: 7.1 G/DL (ref 6.4–8.2)
RBC # BLD AUTO: 4.37 X10(6)UL
SODIUM SERPL-SCNC: 138 MMOL/L (ref 136–145)
TRIGL SERPL-MCNC: 65 MG/DL (ref 30–149)
TSI SER-ACNC: 0.2 MIU/ML (ref 0.36–3.74)
URATE SERPL-MCNC: 5.8 MG/DL
VLDLC SERPL CALC-MCNC: 9 MG/DL (ref 0–30)
WBC # BLD AUTO: 7.1 X10(3) UL (ref 4–11)

## 2023-06-02 PROCEDURE — 82570 ASSAY OF URINE CREATININE: CPT

## 2023-06-02 PROCEDURE — 80053 COMPREHEN METABOLIC PANEL: CPT

## 2023-06-02 PROCEDURE — 84443 ASSAY THYROID STIM HORMONE: CPT

## 2023-06-02 PROCEDURE — 36415 COLL VENOUS BLD VENIPUNCTURE: CPT

## 2023-06-02 PROCEDURE — 83036 HEMOGLOBIN GLYCOSYLATED A1C: CPT

## 2023-06-02 PROCEDURE — 85025 COMPLETE CBC W/AUTO DIFF WBC: CPT

## 2023-06-02 PROCEDURE — 80061 LIPID PANEL: CPT

## 2023-06-02 PROCEDURE — 84550 ASSAY OF BLOOD/URIC ACID: CPT

## 2023-06-02 PROCEDURE — 82043 UR ALBUMIN QUANTITATIVE: CPT

## 2023-06-05 ENCOUNTER — OFFICE VISIT (OUTPATIENT)
Dept: FAMILY MEDICINE CLINIC | Facility: CLINIC | Age: 83
End: 2023-06-05
Payer: MEDICARE

## 2023-06-05 VITALS
DIASTOLIC BLOOD PRESSURE: 86 MMHG | TEMPERATURE: 98 F | HEIGHT: 59 IN | RESPIRATION RATE: 16 BRPM | WEIGHT: 135 LBS | SYSTOLIC BLOOD PRESSURE: 124 MMHG | OXYGEN SATURATION: 98 % | BODY MASS INDEX: 27.21 KG/M2 | HEART RATE: 80 BPM

## 2023-06-05 DIAGNOSIS — I48.0 PAROXYSMAL ATRIAL FIBRILLATION (HCC): ICD-10-CM

## 2023-06-05 DIAGNOSIS — N18.31 STAGE 3A CHRONIC KIDNEY DISEASE (HCC): ICD-10-CM

## 2023-06-05 DIAGNOSIS — I10 ESSENTIAL HYPERTENSION: ICD-10-CM

## 2023-06-05 DIAGNOSIS — J41.0 SIMPLE CHRONIC BRONCHITIS (HCC): ICD-10-CM

## 2023-06-05 DIAGNOSIS — E78.49 FAMILIAL MULTIPLE LIPOPROTEIN-TYPE HYPERLIPIDEMIA: ICD-10-CM

## 2023-06-05 DIAGNOSIS — N18.31 TYPE 2 DIABETES MELLITUS WITH STAGE 3A CHRONIC KIDNEY DISEASE, WITH LONG-TERM CURRENT USE OF INSULIN (HCC): Primary | ICD-10-CM

## 2023-06-05 DIAGNOSIS — E11.22 TYPE 2 DIABETES MELLITUS WITH STAGE 3A CHRONIC KIDNEY DISEASE, WITH LONG-TERM CURRENT USE OF INSULIN (HCC): Primary | ICD-10-CM

## 2023-06-05 DIAGNOSIS — Z79.4 TYPE 2 DIABETES MELLITUS WITH STAGE 3A CHRONIC KIDNEY DISEASE, WITH LONG-TERM CURRENT USE OF INSULIN (HCC): Primary | ICD-10-CM

## 2023-06-05 DIAGNOSIS — I50.32 CHRONIC DIASTOLIC HEART FAILURE (HCC): ICD-10-CM

## 2023-06-05 PROCEDURE — 99214 OFFICE O/P EST MOD 30 MIN: CPT | Performed by: FAMILY MEDICINE

## 2023-06-05 RX ORDER — INSULIN GLARGINE 100 [IU]/ML
12 INJECTION, SOLUTION SUBCUTANEOUS DAILY
Qty: 36 ML | Refills: 3 | COMMUNITY
Start: 2023-06-05

## 2023-06-06 DIAGNOSIS — N18.32 TYPE 2 DIABETES MELLITUS WITH STAGE 3B CHRONIC KIDNEY DISEASE, WITH LONG-TERM CURRENT USE OF INSULIN (HCC): ICD-10-CM

## 2023-06-06 DIAGNOSIS — E11.22 TYPE 2 DIABETES MELLITUS WITH STAGE 3B CHRONIC KIDNEY DISEASE, WITH LONG-TERM CURRENT USE OF INSULIN (HCC): ICD-10-CM

## 2023-06-06 DIAGNOSIS — Z79.4 TYPE 2 DIABETES MELLITUS WITH STAGE 3B CHRONIC KIDNEY DISEASE, WITH LONG-TERM CURRENT USE OF INSULIN (HCC): ICD-10-CM

## 2023-06-06 NOTE — TELEPHONE ENCOUNTER
Future appt: Your appointments     Date & Time Appointment Department [facility])    Nov 30, 2023  8:15 AM CST Laboratory Visit with REF 1 Mera Verduzco, [place] (EDW Ref Lab [place])        Dec 08, 2023 10:30 AM CST Follow up - Extended with Yoselin Cazares [place] [address] [facility] [place] ([facility])            Vivian Mead [place]  EDW Ref Lab [place]  69 [facility] [address]  [facility] [facility] [place]  [place] [address] [phone]  [phone]        Last Appointment with provider:   6/5/2023  Last appointment at [facility] [place]:  6/5/2023  Cholesterol, Total (mg/dL)   Date Value   06/02/2023 112     HDL Cholesterol (mg/dL)   Date Value   06/02/2023 62 (H)     LDL Cholesterol (mg/dL)   Date Value   06/02/2023 36     Triglycerides (mg/dL)   Date Value   06/02/2023 65     Lab Results   Component Value Date     (H) 06/02/2023    A1C 8.6 (H) 06/02/2023     Lab Results   Component Value Date    T4F 2.1 (H) 12/01/2020    TSH 0.199 (L) 06/02/2023     Last RF:  12/12/2022    No follow-ups on file.

## 2023-06-16 RX ORDER — LEVOTHYROXINE SODIUM 88 UG/1
TABLET ORAL
Qty: 90 TABLET | Refills: 1 | Status: SHIPPED | OUTPATIENT
Start: 2023-06-16

## 2023-06-16 NOTE — TELEPHONE ENCOUNTER
Levothyroxine: 12/20/23    Future appt: Your appointments     Date & Time Appointment Department [facility])    Nov 30, 2023  8:15 AM CST Laboratory Visit with REF 1 Mera Marip Verduzco, [place] (EDW Ref Lab [place])        Dec 08, 2023 10:30 AM CST Follow up - Extended with Chavo Rausch MD 8300 [facility], [place] ([facility])            Vivian Cao Ellis Simons  EDW Ref Lab [place]  [address] Eli Smoker [address]  12[address] [facility] [place]  [place] [address] [phone]  [phone]        Last Appointment with provider:   6/5/2023  Last appointment at [facility] [place]:  6/5/2023  Cholesterol, Total (mg/dL)   Date Value   06/02/2023 112     HDL Cholesterol (mg/dL)   Date Value   06/02/2023 62 (H)     LDL Cholesterol (mg/dL)   Date Value   06/02/2023 36     Triglycerides (mg/dL)   Date Value   06/02/2023 65     Lab Results   Component Value Date     (H) 06/02/2023    A1C 8.6 (H) 06/02/2023     Lab Results   Component Value Date    T4F 2.1 (H) 12/01/2020    TSH 0.199 (L) 06/02/2023       No follow-ups on file.

## 2023-07-21 RX ORDER — INSULIN GLARGINE 100 [IU]/ML
14 INJECTION, SOLUTION SUBCUTANEOUS DAILY
Qty: 15 ML | Refills: 3 | Status: SHIPPED | OUTPATIENT
Start: 2023-07-21

## 2023-07-21 NOTE — TELEPHONE ENCOUNTER
Last refill-06/05/23 36 ml 3 refills   Last physical-12/28/22   F/U instructions-Return in 6 months (on 6/28/2023). Future Appointments   Date Time Provider Jacey Emerson   11/30/2023  8:15 AM REF [place] REF [facility] Ref Syc   12/8/2023 10:30 AM Billie Jeff MD [facility] [place] [facility] [place]     Future appt: Your appointments       Date & Time Appointment Department [facility])    Nov 30, 2023  8:15 AM CST Laboratory Visit with REF 1 Mera Kenny Dax, [place] (EDW Ref Lab [place])        Dec 08, 2023 10:30 AM CST Medicare Annual Well Visit with Billie Jeff MD [facility] [facility])              Vivian Bowens [place]  EDW Ref Lab [place]  [address] [facility] [address]  1205 [facility] [place]  [place] [address] [phone]  [phone]          Last Appointment with provider:   6/5/2023  Last appointment at [facility] [place]:  6/5/2023  Cholesterol, Total (mg/dL)   Date Value   06/02/2023 112     HDL Cholesterol (mg/dL)   Date Value   06/02/2023 62 (H)     LDL Cholesterol (mg/dL)   Date Value   06/02/2023 36     Triglycerides (mg/dL)   Date Value   06/02/2023 65     Lab Results   Component Value Date     (H) 06/02/2023    A1C 8.6 (H) 06/02/2023     Lab Results   Component Value Date    T4F 2.1 (H) 12/01/2020    TSH 0.199 (L) 06/02/2023       No follow-ups on file.

## 2023-08-07 RX ORDER — APIXABAN 5 MG/1
5 TABLET, FILM COATED ORAL 2 TIMES DAILY
Qty: 180 TABLET | Refills: 1 | Status: SHIPPED | OUTPATIENT
Start: 2023-08-07

## 2023-08-07 NOTE — TELEPHONE ENCOUNTER
Eliquis: 8/23/22  Return in about 6 months (around 12/5/2023). Future appt: Your appointments       Date & Time Appointment Department [facility])    Nov 30, 2023  8:15 AM CST Laboratory Visit with REF 1 Mera MaVivian Christian [place] (EDW Ref Lab [place])        Dec 08, 2023 10:30 AM CST Medicare Annual Well Visit with MD Ez Treadwell [place] ([facility])              Vivian Telles [place]  EDW Ref Lab [place]  [address] [place] [address]  [address] [facility] [place]  [place] [address] [phone]  [phone]          Last Appointment with provider:   6/5/2023  Last appointment at [facility] [place]:  6/5/2023  Cholesterol, Total (mg/dL)   Date Value   06/02/2023 112     HDL Cholesterol (mg/dL)   Date Value   06/02/2023 62 (H)     LDL Cholesterol (mg/dL)   Date Value   06/02/2023 36     Triglycerides (mg/dL)   Date Value   06/02/2023 65     Lab Results   Component Value Date     (H) 06/02/2023    A1C 8.6 (H) 06/02/2023     Lab Results   Component Value Date    T4F 2.1 (H) 12/01/2020    TSH 0.199 (L) 06/02/2023       No follow-ups on file.

## 2023-08-16 ENCOUNTER — TELEPHONE (OUTPATIENT)
Dept: FAMILY MEDICINE CLINIC | Facility: CLINIC | Age: 83
End: 2023-08-16

## 2023-08-23 ENCOUNTER — TELEPHONE (OUTPATIENT)
Dept: FAMILY MEDICINE CLINIC | Facility: CLINIC | Age: 83
End: 2023-08-23

## 2023-08-23 NOTE — TELEPHONE ENCOUNTER
Have not received faxed request.     Unable to get a hold of company at this time. Will await request via fax.

## 2023-09-26 ENCOUNTER — TELEPHONE (OUTPATIENT)
Dept: FAMILY MEDICINE CLINIC | Facility: CLINIC | Age: 83
End: 2023-09-26

## 2023-09-26 DIAGNOSIS — Z79.4 TYPE 2 DIABETES MELLITUS WITH STAGE 3B CHRONIC KIDNEY DISEASE, WITH LONG-TERM CURRENT USE OF INSULIN (HCC): Primary | ICD-10-CM

## 2023-09-26 DIAGNOSIS — N18.32 TYPE 2 DIABETES MELLITUS WITH STAGE 3B CHRONIC KIDNEY DISEASE, WITH LONG-TERM CURRENT USE OF INSULIN (HCC): Primary | ICD-10-CM

## 2023-09-26 DIAGNOSIS — E11.22 TYPE 2 DIABETES MELLITUS WITH STAGE 3B CHRONIC KIDNEY DISEASE, WITH LONG-TERM CURRENT USE OF INSULIN (HCC): Primary | ICD-10-CM

## 2023-09-26 NOTE — TELEPHONE ENCOUNTER
Future Appointments   Date Time Provider Jacey Emerson   11/30/2023  8:15 AM REF [place] REF [facility] Ref Syc   12/8/2023 10:30 AM Merrick Carlin MD [facility] [place] [facility] [place]       Please enter lab orders for upcoming appointment

## 2025-03-20 NOTE — TELEPHONE ENCOUNTER
----- Message from Carol Mann sent at 12/15/2020  9:59 AM CST -----  Regarding: lab orders needed  Please place lab orders        Thanks,  [employer] Never smoker

## (undated) NOTE — MR AVS SNAPSHOT
[address]  [address] [phone]  [phone]               Thank you for choosing us for your health care visit with Jazmine Olsen MD.  We are glad to serve you and happy to provide you with this summary o CARDIZEM  MG Cp24   Generic drug:  DilTIAZem HCl ER Coated Beads   Take 1 tablet by mouth 2 (two) times daily.            ciprofloxacin HCl 0.3 % Soln   2 drops every 2 hours while awake to both eyes x 2 days, then 2 drops to both eyes every 4 hours visit,  view other health information, and more. To sign up or find more information, go to [email or web address] and click on the Sign Up Now link in the [employer] box.      Enter your Local Eye Site Activation Code exactly as it appears below along with yo

## (undated) NOTE — MR AVS SNAPSHOT
[id number] [phone]               Thank you for choosing us for your health care visit with MAXWELL Olmedo.   We are glad to serve you and happy to provide you with this summary of yo Allergies as of May 08, 2017     Pravastatin     Other reaction(s): severe muscle aches    Sulfa Antibiotics     Other reaction(s): developed rash                Today's Vital Signs     BP Pulse Temp Height Weight BMI    112/74 mmHg 112 97.7 °F (36. Commonly known as:  VICTOZA           MetFORMIN HCl 1000 MG Tabs   Take 1 tablet by mouth daily. Commonly known as:  GLUCOPHAGE           methylPREDNISolone 4 MG Tbpk   As directed.    Commonly known as:  MEDROL                Where to Get Your Medication

## (undated) NOTE — MR AVS SNAPSHOT
[address] [facility] [address] [id number]  [phone]               Thank you for choosing us for your health care visit with MAXWELL Tran.   We are glad to serve you and happy to provide you with this summary of yo Take 1 tablet by mouth daily. azithromycin 250 MG Tabs   Take two tablets by mouth today, then one tablet daily. Commonly known as:  ZITHROMAX Z-FALGUNI           bisoprolol-hydrochlorothiazide 5-6.25 MG Tabs   Take 1 tablet by mouth daily.    Commo Enter your [employer] Activation Code exactly as it appears below along with your Zip Code and Date of Birth to complete the sign-up process. If you do not sign up before the expiration date, you must request a new code.     Your unique [employer] Access Code: [email or web address] Visit [facility] online at  [email or web address]

## (undated) NOTE — MR AVS SNAPSHOT
[address] [place]  [address] [id number] [phone]               Thank you for choosing us for your health care visit with Khadar Sinha MD.  We are glad to serve you and happy to provide you with this summary o ciprofloxacin HCl 0.3 % Soln   2 drops every 2 hours while awake to both eyes x 2 days, then 2 drops to both eyes every 4 hours while awake x 5 days.    Commonly known as:  CILOXAN           DilTIAZem HCl ER Coated Beads 120 MG Cp24   Take 1 capsule (120 m - MetFORMIN HCl 1000 MG Tabs      These medications were sent to [facility] [address] [facility] [place], [address] [facility], [phone]  [facility], [address] [place], [place] [address]     Phone:  [phone]    - Amoxicillin-Pot Clavulanate 875-125 MG Ta